# Patient Record
Sex: FEMALE | Race: WHITE | Employment: UNEMPLOYED | URBAN - METROPOLITAN AREA
[De-identification: names, ages, dates, MRNs, and addresses within clinical notes are randomized per-mention and may not be internally consistent; named-entity substitution may affect disease eponyms.]

---

## 2018-11-01 ENCOUNTER — HOSPITAL ENCOUNTER (EMERGENCY)
Age: 73
Discharge: HOME | End: 2018-11-01
Payer: MEDICARE

## 2018-11-01 ENCOUNTER — APPOINTMENT (OUTPATIENT)
Dept: CT IMAGING | Age: 73
End: 2018-11-01
Payer: MEDICARE

## 2018-11-01 VITALS
DIASTOLIC BLOOD PRESSURE: 89 MMHG | HEART RATE: 90 BPM | OXYGEN SATURATION: 96 % | BODY MASS INDEX: 24.19 KG/M2 | RESPIRATION RATE: 16 BRPM | HEIGHT: 59 IN | TEMPERATURE: 98.5 F | SYSTOLIC BLOOD PRESSURE: 144 MMHG | WEIGHT: 120 LBS

## 2018-11-01 DIAGNOSIS — S16.1XXA CERVICAL STRAIN, ACUTE, INITIAL ENCOUNTER: Primary | ICD-10-CM

## 2018-11-01 PROCEDURE — 99284 EMERGENCY DEPT VISIT MOD MDM: CPT

## 2018-11-01 PROCEDURE — 6370000000 HC RX 637 (ALT 250 FOR IP): Performed by: PHYSICIAN ASSISTANT

## 2018-11-01 PROCEDURE — 70450 CT HEAD/BRAIN W/O DYE: CPT

## 2018-11-01 PROCEDURE — 72125 CT NECK SPINE W/O DYE: CPT

## 2018-11-01 RX ORDER — ACETAMINOPHEN 325 MG/1
650 TABLET ORAL ONCE
Status: COMPLETED | OUTPATIENT
Start: 2018-11-01 | End: 2018-11-01

## 2018-11-01 RX ORDER — DIAZEPAM 2 MG/1
2 TABLET ORAL ONCE
Status: COMPLETED | OUTPATIENT
Start: 2018-11-01 | End: 2018-11-01

## 2018-11-01 RX ORDER — DIAZEPAM 2 MG/1
2 TABLET ORAL EVERY 8 HOURS PRN
Qty: 15 TABLET | Refills: 0 | Status: SHIPPED | OUTPATIENT
Start: 2018-11-01 | End: 2018-11-06

## 2018-11-01 RX ADMIN — DIAZEPAM 2 MG: 2 TABLET ORAL at 19:25

## 2018-11-01 RX ADMIN — ACETAMINOPHEN 650 MG: 325 TABLET ORAL at 19:25

## 2018-11-01 ASSESSMENT — PAIN DESCRIPTION - ORIENTATION: ORIENTATION: POSTERIOR

## 2018-11-01 ASSESSMENT — PAIN SCALES - GENERAL: PAINLEVEL_OUTOF10: 7

## 2018-11-01 ASSESSMENT — PAIN DESCRIPTION - LOCATION: LOCATION: HEAD

## 2018-11-01 ASSESSMENT — PAIN DESCRIPTION - DESCRIPTORS: DESCRIPTORS: ACHING

## 2018-11-01 ASSESSMENT — PAIN DESCRIPTION - FREQUENCY: FREQUENCY: CONTINUOUS

## 2018-11-01 ASSESSMENT — PAIN DESCRIPTION - PAIN TYPE: TYPE: ACUTE PAIN

## 2018-11-01 NOTE — ED PROVIDER NOTES
[facility]     Department of Emergency Medicine   ED  Provider Note  Admit Date/RoomTime: 11/1/2018  7:05 PM  ED Room: /  Chief Complaint   Fall (pt states she fell forward on sunday and hit right side of head. she is having a headache for the past few days. no LOC, no thinners. ) and Neck Pain (left neck)    History of Present Illness   Source of history provided by:  patient. History/Exam Limitations: none. Radha Green is a 75 y.o. old female who has a past medical history of:   Past Medical History:   Diagnosis Date    Hyperlipidemia     Thyroid disease     presents to the emergency department by private vehicle, for a fall which occured 6 day(s) prior to arrival. She was reportedly walking and lost balance leaning over while at home prior to incident with complaints of persistent left upper neck pain causing persistent headache. The patients tetanus status is up to date. Since onset the symptoms have been moderate in degree. Her pain is aggraveated by nothing in particular and relieved by nothing, she did try 800 motrin. She denies any loss of consciousness, chest pain, abdominal pain, numbness or weakness. .  ROS    Pertinent positives and negatives are stated within HPI, all other systems reviewed and are negative. Past Surgical History:   Procedure Laterality Date    APPENDECTOMY      FINGER TRIGGER RELEASE      HERNIA REPAIR      KNEE SURGERY Left 2016    LAPAROSCOPY  1997    OVARIAN CYST SURGERY  1962    RUPTURED OVARIAN CYST   Social History:  reports that she quit smoking about 21 years ago. She has never used smokeless tobacco. She reports that she drinks alcohol. She reports that she does not use drugs. Family History: family history includes Anxiety Disorder in her mother and another family member; Arthritis in her father and mother; Breast Cancer in her maternal aunt; Diabetes in her father, maternal aunt, and maternal aunt;  Heart Disease in her mother; Lung diffuse   degenerative changes are identified from C2 to T1 with osteophytes and   multilevel disc bulges. CT Head WO Contrast   Final Result      No acute intracranial hemorrhage or mass effect. Findings compatible with chronic microvascular ischemia. ED Course / Medical Decision Making     Medications   acetaminophen (TYLENOL) tablet 650 mg (650 mg Oral Given 11/1/18 1925)   diazepam (VALIUM) tablet 2 mg (2 mg Oral Given 11/1/18 1925)        Re-examination:  11/1/18     Time: Patient reports feeling a lot better after the Valium although it is making her sleepy. Consult(s):   None    Procedure(s):   none    MDM:   Patient presents with persistent left-sided neck pain and headache after falling several days ago. She kind of tripped forward as she was leaning too far forward and hit the side of her head on the door jam. Imaging both of head and cervical spine were negative for fracture or scleral hemorrhage. Patient was given some Valium for muscle relaxation and advised to continue that with the Tylenol. Follow-up with primary care doctor as needed if she needs physical therapy. Counseling: The emergency provider has spoken with the patient and spouse/SO and discussed todays results, in addition to providing specific details for the plan of care and counseling regarding the diagnosis and prognosis. Questions are answered at this time and they are agreeable with the plan. Assessment      1. Cervical strain, acute, initial encounter      Plan   Discharge to home  Patient condition is stable    New Medications     Discharge Medication List as of 11/1/2018  8:22 PM      START taking these medications    Details   diazepam (VALIUM) 2 MG tablet Take 1 tablet by mouth every 8 hours as needed (muscle tension) for up to 5 days. ., Disp-15 tablet, R-0Print           Electronically signed by Nicole Aceves PA-C   DD: 11/1/18  **This report was transcribed using voice

## 2020-12-20 ENCOUNTER — APPOINTMENT (OUTPATIENT)
Dept: CT IMAGING | Age: 75
End: 2020-12-20
Payer: MEDICARE

## 2020-12-20 ENCOUNTER — HOSPITAL ENCOUNTER (EMERGENCY)
Age: 75
Discharge: HOME | End: 2020-12-20
Attending: EMERGENCY MEDICINE
Payer: MEDICARE

## 2020-12-20 VITALS
BODY MASS INDEX: 24.6 KG/M2 | HEIGHT: 59 IN | SYSTOLIC BLOOD PRESSURE: 111 MMHG | RESPIRATION RATE: 20 BRPM | TEMPERATURE: 97.4 F | DIASTOLIC BLOOD PRESSURE: 73 MMHG | WEIGHT: 122 LBS | HEART RATE: 91 BPM | OXYGEN SATURATION: 97 %

## 2020-12-20 LAB
ALBUMIN SERPL-MCNC: 3.6 G/DL (ref 3.5–5.2)
ALP BLD-CCNC: 62 U/L (ref 35–104)
ALT SERPL-CCNC: 20 U/L (ref 0–32)
ANION GAP SERPL CALCULATED.3IONS-SCNC: 9 MMOL/L (ref 7–16)
AST SERPL-CCNC: 42 U/L (ref 0–31)
BACTERIA: ABNORMAL /HPF
BASOPHILS ABSOLUTE: 0.04 E9/L (ref 0–0.2)
BASOPHILS RELATIVE PERCENT: 0.3 % (ref 0–2)
BILIRUB SERPL-MCNC: 0.2 MG/DL (ref 0–1.2)
BILIRUBIN URINE: NEGATIVE
BLOOD, URINE: ABNORMAL
BUN BLDV-MCNC: 16 MG/DL (ref 8–23)
CALCIUM SERPL-MCNC: 8 MG/DL (ref 8.6–10.2)
CHLORIDE BLD-SCNC: 109 MMOL/L (ref 98–107)
CLARITY: CLEAR
CO2: 22 MMOL/L (ref 22–29)
COLOR: YELLOW
CREAT SERPL-MCNC: 0.8 MG/DL (ref 0.5–1)
EOSINOPHILS ABSOLUTE: 0.09 E9/L (ref 0.05–0.5)
EOSINOPHILS RELATIVE PERCENT: 0.7 % (ref 0–6)
GFR AFRICAN AMERICAN: >60
GFR NON-AFRICAN AMERICAN: >60 ML/MIN/1.73
GLUCOSE BLD-MCNC: 108 MG/DL (ref 74–99)
GLUCOSE URINE: NEGATIVE MG/DL
HCT VFR BLD CALC: 40 % (ref 34–48)
HEMOGLOBIN: 13.1 G/DL (ref 11.5–15.5)
IMMATURE GRANULOCYTES #: 0.04 E9/L
IMMATURE GRANULOCYTES %: 0.3 % (ref 0–5)
KETONES, URINE: NEGATIVE MG/DL
LACTIC ACID, SEPSIS: 2.3 MMOL/L (ref 0.5–1.9)
LEUKOCYTE ESTERASE, URINE: ABNORMAL
LYMPHOCYTES ABSOLUTE: 0.96 E9/L (ref 1.5–4)
LYMPHOCYTES RELATIVE PERCENT: 8 % (ref 20–42)
MCH RBC QN AUTO: 30.5 PG (ref 26–35)
MCHC RBC AUTO-ENTMCNC: 32.8 % (ref 32–34.5)
MCV RBC AUTO: 93.2 FL (ref 80–99.9)
MONOCYTES ABSOLUTE: 0.55 E9/L (ref 0.1–0.95)
MONOCYTES RELATIVE PERCENT: 4.6 % (ref 2–12)
NEUTROPHILS ABSOLUTE: 10.35 E9/L (ref 1.8–7.3)
NEUTROPHILS RELATIVE PERCENT: 86.1 % (ref 43–80)
NITRITE, URINE: NEGATIVE
PDW BLD-RTO: 13.2 FL (ref 11.5–15)
PH UA: 7 (ref 5–9)
PLATELET # BLD: 231 E9/L (ref 130–450)
PMV BLD AUTO: 10.2 FL (ref 7–12)
POTASSIUM REFLEX MAGNESIUM: 5.2 MMOL/L (ref 3.5–5)
PROTEIN UA: NEGATIVE MG/DL
RBC # BLD: 4.29 E12/L (ref 3.5–5.5)
RBC UA: ABNORMAL /HPF (ref 0–2)
SODIUM BLD-SCNC: 140 MMOL/L (ref 132–146)
SPECIFIC GRAVITY UA: 1.01 (ref 1–1.03)
TOTAL PROTEIN: 6.1 G/DL (ref 6.4–8.3)
UROBILINOGEN, URINE: 0.2 E.U./DL
WBC # BLD: 12 E9/L (ref 4.5–11.5)
WBC UA: ABNORMAL /HPF (ref 0–5)

## 2020-12-20 PROCEDURE — 2580000003 HC RX 258: Performed by: EMERGENCY MEDICINE

## 2020-12-20 PROCEDURE — 83605 ASSAY OF LACTIC ACID: CPT

## 2020-12-20 PROCEDURE — 96375 TX/PRO/DX INJ NEW DRUG ADDON: CPT

## 2020-12-20 PROCEDURE — 87088 URINE BACTERIA CULTURE: CPT

## 2020-12-20 PROCEDURE — 96374 THER/PROPH/DIAG INJ IV PUSH: CPT

## 2020-12-20 PROCEDURE — 6360000002 HC RX W HCPCS: Performed by: EMERGENCY MEDICINE

## 2020-12-20 PROCEDURE — 74177 CT ABD & PELVIS W/CONTRAST: CPT

## 2020-12-20 PROCEDURE — 80053 COMPREHEN METABOLIC PANEL: CPT

## 2020-12-20 PROCEDURE — 2580000003 HC RX 258: Performed by: NURSE PRACTITIONER

## 2020-12-20 PROCEDURE — 6360000002 HC RX W HCPCS: Performed by: NURSE PRACTITIONER

## 2020-12-20 PROCEDURE — 81001 URINALYSIS AUTO W/SCOPE: CPT

## 2020-12-20 PROCEDURE — 2580000003 HC RX 258: Performed by: RADIOLOGY

## 2020-12-20 PROCEDURE — 85025 COMPLETE CBC W/AUTO DIFF WBC: CPT

## 2020-12-20 PROCEDURE — 99285 EMERGENCY DEPT VISIT HI MDM: CPT

## 2020-12-20 PROCEDURE — 6360000004 HC RX CONTRAST MEDICATION: Performed by: RADIOLOGY

## 2020-12-20 RX ORDER — ONDANSETRON 2 MG/ML
4 INJECTION INTRAMUSCULAR; INTRAVENOUS
Status: DISCONTINUED | OUTPATIENT
Start: 2020-12-20 | End: 2020-12-20 | Stop reason: HOSPADM

## 2020-12-20 RX ORDER — MORPHINE SULFATE 4 MG/ML
4 INJECTION, SOLUTION INTRAMUSCULAR; INTRAVENOUS ONCE
Status: COMPLETED | OUTPATIENT
Start: 2020-12-20 | End: 2020-12-20

## 2020-12-20 RX ORDER — HYDROCODONE BITARTRATE AND ACETAMINOPHEN 5; 325 MG/1; MG/1
1 TABLET ORAL EVERY 6 HOURS PRN
Qty: 10 TABLET | Refills: 0 | Status: SHIPPED | OUTPATIENT
Start: 2020-12-20 | End: 2020-12-23

## 2020-12-20 RX ORDER — ONDANSETRON 4 MG/1
4 TABLET, FILM COATED ORAL EVERY 8 HOURS PRN
Qty: 20 TABLET | Refills: 0 | Status: SHIPPED | OUTPATIENT
Start: 2020-12-20

## 2020-12-20 RX ORDER — SODIUM CHLORIDE 0.9 % (FLUSH) 0.9 %
10 SYRINGE (ML) INJECTION
Status: COMPLETED | OUTPATIENT
Start: 2020-12-20 | End: 2020-12-20

## 2020-12-20 RX ORDER — KETOROLAC TROMETHAMINE 30 MG/ML
15 INJECTION, SOLUTION INTRAMUSCULAR; INTRAVENOUS ONCE
Status: COMPLETED | OUTPATIENT
Start: 2020-12-20 | End: 2020-12-20

## 2020-12-20 RX ORDER — 0.9 % SODIUM CHLORIDE 0.9 %
500 INTRAVENOUS SOLUTION INTRAVENOUS ONCE
Status: COMPLETED | OUTPATIENT
Start: 2020-12-20 | End: 2020-12-20

## 2020-12-20 RX ORDER — IBUPROFEN 800 MG/1
800 TABLET ORAL EVERY 8 HOURS PRN
Qty: 12 TABLET | Refills: 0 | Status: SHIPPED | OUTPATIENT
Start: 2020-12-20

## 2020-12-20 RX ORDER — POLYETHYLENE GLYCOL 3350 17 G/17G
17 POWDER, FOR SOLUTION ORAL DAILY
Qty: 1530 G | Refills: 1 | Status: SHIPPED | OUTPATIENT
Start: 2020-12-20 | End: 2021-01-19

## 2020-12-20 RX ADMIN — IOPAMIDOL 90 ML: 755 INJECTION, SOLUTION INTRAVENOUS at 12:28

## 2020-12-20 RX ADMIN — MORPHINE SULFATE 4 MG: 4 INJECTION, SOLUTION INTRAMUSCULAR; INTRAVENOUS at 10:41

## 2020-12-20 RX ADMIN — KETOROLAC TROMETHAMINE 15 MG: 30 INJECTION, SOLUTION INTRAMUSCULAR at 14:12

## 2020-12-20 RX ADMIN — CEFTRIAXONE SODIUM 2 G: 2 INJECTION, POWDER, FOR SOLUTION INTRAMUSCULAR; INTRAVENOUS at 15:04

## 2020-12-20 RX ADMIN — Medication 10 ML: at 12:29

## 2020-12-20 RX ADMIN — SODIUM CHLORIDE 500 ML: 9 INJECTION, SOLUTION INTRAVENOUS at 10:43

## 2020-12-20 ASSESSMENT — PAIN SCALES - GENERAL
PAINLEVEL_OUTOF10: 7
PAINLEVEL_OUTOF10: 5
PAINLEVEL_OUTOF10: 9

## 2020-12-20 ASSESSMENT — PAIN DESCRIPTION - ORIENTATION: ORIENTATION: LEFT;LOWER

## 2020-12-20 ASSESSMENT — PAIN DESCRIPTION - PAIN TYPE: TYPE: ACUTE PAIN

## 2020-12-20 ASSESSMENT — PAIN DESCRIPTION - LOCATION: LOCATION: ABDOMEN;BACK

## 2020-12-20 NOTE — ED PROVIDER NOTES
ED Attending  CC: Lotus Zepeda [id number]  Department of Emergency Medicine   ED  Encounter Note  Admit Date/RoomTime: 2020  9:42 AM  ED Room: [facility]    NAME: Christi Brown  : 1945  MRN: [id number]     Chief Complaint:  Abdominal Pain (LLQ pain radiating into back, receieved 50mcg fentanyl PTA by EMS. States she woke up this AM at 7am with it. Also reports no BM since Thursday and that her urine is coon yellow with a strong odor. )    History of Present Illness        Christi Brown is a 67 y.o. old female who presents to the emergency department by private vehicle for gradual onset sharp achy left quadrant and left flank pain that began at 7 AM this morning. She states that this did not wake her up out of sleep. She fell she had to have a bowel movement as she has not had one over the last 2 days. As she attempted to have a bowel movement she had worsening of her pain with nausea, no vomiting, and chills. She contacted her PCP who referred her to the emergency department for a CAT scan. She believes she has a history of diverticulosis and denies a history of kidney stones. She has noticed that her urine is a coon color without any dysuria or hematuria. She admits to a history of ovarian cyst in the past and has not noticed any vaginal bleeding or discharge. There is no associated fever, syncope, chest pain or shortness of breath. She received fentanyl and Zofran via ambulance with improvement in her symptoms however the pain is persistent. ROS   Pertinent positives and negatives are stated within HPI, all other systems reviewed and are negative. Past Medical History:  has a past medical history of Hyperlipidemia and Thyroid disease. Surgical History has a past surgical history that includes hernia repair; Appendectomy; Finger trigger release; laparoscopy (); Ovarian cyst surgery (); and knee surgery (Left, 2016).     Social History:  reports that she quit smoking about 23 years ago. She has never used smokeless tobacco. She reports current alcohol use. She reports that she does not use drugs. Family History: family history includes Anxiety Disorder in her mother and another family member; Arthritis in her father and mother; Breast Cancer in her maternal aunt; Diabetes in her father, maternal aunt, and maternal aunt; Heart Disease in her mother; Yaaka Liter in her father; Migraines in her maternal grandmother; Other in her brother. Allergies: Patient has no known allergies. Physical Exam   Oxygen Saturation Interpretation: Normal.        ED Triage Vitals [12/20/20 0944]   BP Temp Temp Source Pulse Resp SpO2 Height Weight   (!) 155/80 97.4 °F (36.3 °C) Temporal 81 18 97 % 4' 11\" (1.499 m) 122 lb (55.3 kg)         General Appearance/Constitutional:  Alert, development consistent with age  [de-identified]:  NC/NT. PERRLA. Airway patent. Neck:  Supple. No lymphadenopathy. Respiratory:  No retractions. Lungs Clear to auscultation and breath sounds equal.  CV:  Regular rate and rhythm. No resting tachycardia or murmur. GI:  Non-distended, old scar         Bowel sounds: normal bowel sounds. Tenderness: There is moderate tenderness to the left lower quadrant with guarding and no rebound. There is also tenderness to the left flank without vesicular rash or outward sign of trauma. No tenderness through the upper abdomen or right lower quadrant. Liver: non-tender. Spleen:  non-palpable. Back: CVA Tenderness: No.  Integument:  Normal turgor. Warm, dry, without visible rash, unless noted elsewhere. Lymphatics: No edema, cap.refill <3sec. Neurological:  Orientation age-appropriate. Motor functions intact.     Lab / Imaging Results   (All laboratory and radiology results have been personally reviewed by myself)  Labs:  Results for orders placed or performed during the hospital encounter of 12/20/20   CBC Auto Differential   Result Value Ref Range    WBC 12.0 (H) 4.5 - 11.5 E9/L    RBC 4.29 3.50 - 5.50 E12/L    Hemoglobin 13.1 11.5 - 15.5 g/dL    Hematocrit 40.0 34.0 - 48.0 %    MCV 93.2 80.0 - 99.9 fL    MCH 30.5 26.0 - 35.0 pg    MCHC 32.8 32.0 - 34.5 %    RDW 13.2 11.5 - 15.0 fL    Platelets 635 348 - 662 E9/L    MPV 10.2 7.0 - 12.0 fL    Neutrophils % 86.1 (H) 43.0 - 80.0 %    Immature Granulocytes % 0.3 0.0 - 5.0 %    Lymphocytes % 8.0 (L) 20.0 - 42.0 %    Monocytes % 4.6 2.0 - 12.0 %    Eosinophils % 0.7 0.0 - 6.0 %    Basophils % 0.3 0.0 - 2.0 %    Neutrophils Absolute 10.35 (H) 1.80 - 7.30 E9/L    Immature Granulocytes # 0.04 E9/L    Lymphocytes Absolute 0.96 (L) 1.50 - 4.00 E9/L    Monocytes Absolute 0.55 0.10 - 0.95 E9/L    Eosinophils Absolute 0.09 0.05 - 0.50 E9/L    Basophils Absolute 0.04 0.00 - 0.20 E9/L   Urinalysis   Result Value Ref Range    Color, UA Yellow Straw/Yellow    Clarity, UA Clear Clear    Glucose, Ur Negative Negative mg/dL    Bilirubin Urine Negative Negative    Ketones, Urine Negative Negative mg/dL    Specific Gravity, UA 1.015 1.005 - 1.030    Blood, Urine MODERATE (A) Negative    pH, UA 7.0 5.0 - 9.0    Protein, UA Negative Negative mg/dL    Urobilinogen, Urine 0.2 <2.0 E.U./dL    Nitrite, Urine Negative Negative    Leukocyte Esterase, Urine TRACE (A) Negative   Lactate, Sepsis   Result Value Ref Range    Lactic Acid, Sepsis 2.3 (H) 0.5 - 1.9 mmol/L   Comprehensive Metabolic Panel w/ Reflex to MG   Result Value Ref Range    Sodium 140 132 - 146 mmol/L    Potassium reflex Magnesium 5.2 (H) 3.5 - 5.0 mmol/L    Chloride 109 (H) 98 - 107 mmol/L    CO2 22 22 - 29 mmol/L    Anion Gap 9 7 - 16 mmol/L    Glucose 108 (H) 74 - 99 mg/dL    BUN 16 8 - 23 mg/dL    CREATININE 0.8 0.5 - 1.0 mg/dL    GFR Non-African American >60 >=60 mL/min/1.73    GFR African American >60     Calcium 8.0 (L) 8.6 - 10.2 mg/dL    Total Protein 6.1 (L) 6.4 - 8.3 g/dL    Alb 3.6 3.5 - 5.2 g/dL    Total Bilirubin 0.2 0.0 - 1.2 mg/dL    Alkaline Phosphatase 62 35 - 104 U/L    ALT 20 0 - 32 U/L    AST 42 (H) 0 - 31 U/L   Microscopic Urinalysis   Result Value Ref Range    WBC, UA 0-1 0 - 5 /HPF    RBC, UA 10-20 (A) 0 - 2 /HPF    Bacteria, UA RARE (A) None Seen /HPF     Imaging: All Radiology results interpreted by Radiologist unless otherwise noted. CT ABDOMEN PELVIS W IV CONTRAST Additional Contrast? None   Final Result   Presence of a 3 mm calculus in the left ureter vesicle junction (UVJ) at the   bladder side of the junction causing discrete to mild obstructive uropathy in   the left kidney. The calculus near to be fully passed. See other comments and recommendations. ED Course / Medical Decision Making     Medications   0.9 % sodium chloride bolus (0 mLs Intravenous Stopped 12/20/20 1338)   morphine sulfate (PF) injection 4 mg (4 mg Intravenous Given 12/20/20 1041)   sodium chloride flush 0.9 % injection 10 mL (10 mLs Intravenous Given 12/20/20 1229)   iopamidol (ISOVUE-370) 76 % injection 90 mL (90 mLs Intravenous Given 12/20/20 1228)   ketorolac (TORADOL) injection 15 mg (15 mg Intravenous Given 12/20/20 1412)   cefTRIAXone (ROCEPHIN) 2 g in sterile water 20 mL IV syringe (2 g Intravenous Given 12/20/20 1504)        Re-Evaluations:  12/20/20      Time: 3024   Patients condition is improving after treatment. Abdomen remains soft and patient states her pain is markedly improved after the morphine. She is awaiting to go to CT. Consultations:             None    Procedures:   none    MDM: Patient evaluated by myself and  [facility]. Given her recent constipation and she believes to have a history of diverticulosis, despite her flank pain and coon color urine, it was decided she have a CT with IV contrast to rule out diverticulitis over a dry scan for renal lithiasis. She had improvement with IV morphine while pending her scans. Labs as resulted above.   Care endorsed to  [facility] at [phone] for review of CT results, reevaluation of the patient and disposition. Plan of Care/Counseling:  Myself and Emergency Attending Physician reviewed today's visit with the patient in addition to providing specific details for the plan of care and counseling regarding the diagnosis and prognosis. Questions are answered at this time and are agreeable with the plan. Assessment      1. Kidney stone      This patient's ED course included: a personal history and physicial examination, re-evaluation prior to disposition, multiple bedside re-evaluations and IV medications  This patient has remained hemodynamically stable, improved and remained unchanged during their ED course. Plan   Discharge to home  Patient condition is stable. New Medications     Discharge Medication List as of 12/20/2020  3:26 PM      START taking these medications    Details   polyethylene glycol (GLYCOLAX) 17 GM/SCOOP powder Take 17 g by mouth daily, Disp-1530 g, R-1Print      ondansetron (ZOFRAN) 4 MG tablet Take 1 tablet by mouth every 8 hours as needed for Nausea or Vomiting, Disp-20 tablet, R-0Print      HYDROcodone-acetaminophen (NORCO) 5-325 MG per tablet Take 1 tablet by mouth every 6 hours as needed for Pain for up to 3 days. , Disp-10 tablet, R-0Print      tamsulosin (FLOMAX) 0.4 MG capsule Take 1 capsule by mouth daily, Disp-7 capsule, R-0Print           Electronically signed by GREY Law CNP   DD: 12/20/20  **This report was transcribed using voice recognition software. Every effort was made to ensure accuracy; however, inadvertent computerized transcription errors may be present.   END OF PROVIDER NOTE     GREY Law CNP  12/20/20 1791

## 2020-12-20 NOTE — ED NOTES
Bed: HD  Expected date:   Expected time:   Means of arrival:   Comments:  Jonatan Mauricio RN  12/20/20 5350

## 2020-12-22 LAB — URINE CULTURE, ROUTINE: NORMAL

## 2021-02-01 ENCOUNTER — IMMUNIZATION (OUTPATIENT)
Dept: PRIMARY CARE CLINIC | Age: 76
End: 2021-02-01
Payer: MEDICARE

## 2021-02-01 DIAGNOSIS — Z23 NEED FOR VACCINATION: Primary | ICD-10-CM

## 2021-02-01 PROCEDURE — 0001A COVID-19, PFIZER VACCINE 30MCG/0.3ML DOSE: CPT | Performed by: CLINICAL NURSE SPECIALIST

## 2021-02-01 PROCEDURE — 91300 COVID-19, PFIZER VACCINE 30MCG/0.3ML DOSE: CPT | Performed by: CLINICAL NURSE SPECIALIST

## 2021-02-22 ENCOUNTER — IMMUNIZATION (OUTPATIENT)
Dept: PRIMARY CARE CLINIC | Age: 76
End: 2021-02-22
Payer: MEDICARE

## 2021-02-22 PROCEDURE — 91300 COVID-19, PFIZER VACCINE 30MCG/0.3ML DOSE: CPT | Performed by: CLINICAL NURSE SPECIALIST

## 2021-02-22 PROCEDURE — 0002A COVID-19, PFIZER VACCINE 30MCG/0.3ML DOSE: CPT | Performed by: CLINICAL NURSE SPECIALIST

## 2022-01-11 ENCOUNTER — HOSPITAL ENCOUNTER (OUTPATIENT)
Age: 77
Discharge: HOME | End: 2022-01-13

## 2022-01-11 PROCEDURE — 88305 TISSUE EXAM BY PATHOLOGIST: CPT

## 2022-01-11 PROCEDURE — 88342 IMHCHEM/IMCYTCHM 1ST ANTB: CPT

## 2023-01-09 ENCOUNTER — HOSPITAL ENCOUNTER (OUTPATIENT)
Dept: NUCLEAR MEDICINE | Age: 78
Discharge: HOME | End: 2023-01-09
Payer: MEDICARE

## 2023-01-09 ENCOUNTER — HOSPITAL ENCOUNTER (OUTPATIENT)
Dept: NON INVASIVE DIAGNOSTICS | Age: 78
Discharge: HOME | End: 2023-01-09
Payer: MEDICARE

## 2023-01-09 VITALS — BODY MASS INDEX: 23.59 KG/M2 | WEIGHT: 117 LBS | HEIGHT: 59 IN

## 2023-01-09 LAB
LV EF: 69 %
LVEF MODALITY: NORMAL

## 2023-01-09 PROCEDURE — 3430000000 HC RX DIAGNOSTIC RADIOPHARMACEUTICAL: Performed by: RADIOLOGY

## 2023-01-09 PROCEDURE — A9500 TC99M SESTAMIBI: HCPCS | Performed by: RADIOLOGY

## 2023-01-09 PROCEDURE — 78452 HT MUSCLE IMAGE SPECT MULT: CPT

## 2023-01-09 PROCEDURE — 93016 CV STRESS TEST SUPVJ ONLY: CPT | Performed by: INTERNAL MEDICINE

## 2023-01-09 PROCEDURE — 6360000002 HC RX W HCPCS: Performed by: INTERNAL MEDICINE

## 2023-01-09 PROCEDURE — 93017 CV STRESS TEST TRACING ONLY: CPT

## 2023-01-09 PROCEDURE — 93018 CV STRESS TEST I&R ONLY: CPT | Performed by: INTERNAL MEDICINE

## 2023-01-09 PROCEDURE — 78452 HT MUSCLE IMAGE SPECT MULT: CPT | Performed by: INTERNAL MEDICINE

## 2023-01-09 RX ORDER — TECHNETIUM TC-99M SESTAMIBI 1 MG/10ML
10 INJECTION INTRAVENOUS
Status: COMPLETED | OUTPATIENT
Start: 2023-01-09 | End: 2023-01-09

## 2023-01-09 RX ORDER — TECHNETIUM TC-99M SESTAMIBI 1 MG/10ML
30 INJECTION INTRAVENOUS
Status: COMPLETED | OUTPATIENT
Start: 2023-01-09 | End: 2023-01-09

## 2023-01-09 RX ADMIN — Medication 10 MILLICURIE: at 09:38

## 2023-01-09 RX ADMIN — Medication 30 MILLICURIE: at 09:38

## 2023-01-09 RX ADMIN — REGADENOSON 0.4 MG: 0.08 INJECTION, SOLUTION INTRAVENOUS at 10:05

## 2023-01-09 NOTE — PROCEDURES
[facility] Nuclear Stress Test:    Cardiologist: Dr. Purvi Jones MD    Date: 1/9/2023    Indications for study: Chest pain    No chest pain  No new arrhythmias  No EKG changes suggestive of stress induced ischemia  Nuclear images pending    Purvi Jones MD  [facility]) Cardiology

## 2023-11-25 ENCOUNTER — HOSPITAL ENCOUNTER (EMERGENCY)
Age: 78
Discharge: HOME | End: 2023-11-25
Attending: STUDENT IN AN ORGANIZED HEALTH CARE EDUCATION/TRAINING PROGRAM
Payer: MEDICARE

## 2023-11-25 ENCOUNTER — APPOINTMENT (OUTPATIENT)
Dept: CT IMAGING | Age: 78
End: 2023-11-25
Payer: MEDICARE

## 2023-11-25 VITALS
BODY MASS INDEX: 24.24 KG/M2 | HEART RATE: 96 BPM | DIASTOLIC BLOOD PRESSURE: 73 MMHG | WEIGHT: 120 LBS | RESPIRATION RATE: 17 BRPM | TEMPERATURE: 97.5 F | OXYGEN SATURATION: 97 % | SYSTOLIC BLOOD PRESSURE: 129 MMHG

## 2023-11-25 DIAGNOSIS — S09.90XA INJURY OF HEAD, INITIAL ENCOUNTER: Primary | ICD-10-CM

## 2023-11-25 PROCEDURE — 99284 EMERGENCY DEPT VISIT MOD MDM: CPT

## 2023-11-25 PROCEDURE — 70450 CT HEAD/BRAIN W/O DYE: CPT

## 2023-11-25 ASSESSMENT — PAIN DESCRIPTION - ONSET: ONSET: ON-GOING

## 2023-11-25 ASSESSMENT — LIFESTYLE VARIABLES
HOW OFTEN DO YOU HAVE A DRINK CONTAINING ALCOHOL: NEVER
HOW MANY STANDARD DRINKS CONTAINING ALCOHOL DO YOU HAVE ON A TYPICAL DAY: PATIENT DOES NOT DRINK

## 2023-11-25 ASSESSMENT — PAIN DESCRIPTION - PAIN TYPE: TYPE: ACUTE PAIN

## 2023-11-25 ASSESSMENT — PAIN - FUNCTIONAL ASSESSMENT: PAIN_FUNCTIONAL_ASSESSMENT: 0-10

## 2023-11-25 ASSESSMENT — PAIN SCALES - GENERAL: PAINLEVEL_OUTOF10: 5

## 2023-11-25 ASSESSMENT — PAIN DESCRIPTION - LOCATION: LOCATION: HEAD

## 2023-11-25 ASSESSMENT — PAIN DESCRIPTION - DESCRIPTORS: DESCRIPTORS: ACHING;DISCOMFORT;SORE

## 2023-11-25 ASSESSMENT — PAIN DESCRIPTION - FREQUENCY: FREQUENCY: INTERMITTENT

## 2023-11-25 NOTE — ED PROVIDER NOTES
[address] ENCOUNTER    Pt Name: Tory Bowens  MRN: [id number]  [address] [facility] [place] 1945  Date of evaluation: 11/25/2023  Provider: Allegra Closs, MD  PCP: Omar Card MD  Note Started: 3:21 PM EST 11/25/23    HPI     Patient is a 77 y.o. female presents with a chief complaint of   Chief Complaint   Patient presents with    Head Injury     Hit head on 11/19 on a door, no thinners, no LOC, states since bumping head has pain and feels woozy    . Patient presents because she hit her head. Patient stated that this happened 5 days ago. Patient stated that she is not on any blood thinners and no loss conscious. States that she feels \"woozy. \"  Patient states that she is worried because her  previously had a brain bleed and is worried she may have 1. Denies any fevers, chills, nausea, vomiting, chest pain, shortness of breath, abdominal pain, changes in urinary or bowel habits. Patient denies any changes in vision. Patient stated that she accidentally hit her head lightly on a door and felt following for 2 days. Nursing Notes were all reviewed and agreed with or any disagreements were addressed in the HPI. History From: Patient    Review of Systems   Pertinent positives and negatives as per HPI. Physical Exam  Vitals and nursing note reviewed. Constitutional:       Appearance: She is well-developed. HENT:      Head: Normocephalic and atraumatic. Eyes:      Conjunctiva/sclera: Conjunctivae normal.   Cardiovascular:      Rate and Rhythm: Normal rate and regular rhythm. Heart sounds: Normal heart sounds. No murmur heard. Pulmonary:      Effort: Pulmonary effort is normal. No respiratory distress. Breath sounds: Normal breath sounds. No wheezing or rales. Abdominal:      General: Bowel sounds are normal.      Palpations: Abdomen is soft. Tenderness: There is no abdominal tenderness.  There is no

## 2023-11-25 NOTE — ED NOTES
Pt steady when walking in the iraheta  Sitting quietly playing card game on [address], [address], [address] [place] [address]  11/25/23 9944

## 2024-01-21 ENCOUNTER — APPOINTMENT (OUTPATIENT)
Dept: GENERAL RADIOLOGY | Age: 79
End: 2024-01-21
Payer: MEDICARE

## 2024-01-21 ENCOUNTER — HOSPITAL ENCOUNTER (EMERGENCY)
Age: 79
Discharge: ANOTHER HOSPITAL | End: 2024-01-22
Attending: STUDENT IN AN ORGANIZED HEALTH CARE EDUCATION/TRAINING PROGRAM
Payer: MEDICARE

## 2024-01-21 DIAGNOSIS — S82.892A CLOSED FRACTURE OF MALLEOLUS OF BOTH ANKLES, INITIAL ENCOUNTER: Primary | ICD-10-CM

## 2024-01-21 DIAGNOSIS — S82.891A CLOSED FRACTURE OF MALLEOLUS OF BOTH ANKLES, INITIAL ENCOUNTER: Primary | ICD-10-CM

## 2024-01-21 LAB
ANION GAP SERPL CALCULATED.3IONS-SCNC: 10 MMOL/L (ref 7–16)
BASOPHILS # BLD: 0.05 K/UL (ref 0–0.2)
BASOPHILS NFR BLD: 1 % (ref 0–2)
BUN SERPL-MCNC: 17 MG/DL (ref 6–23)
CALCIUM SERPL-MCNC: 9.4 MG/DL (ref 8.6–10.2)
CHLORIDE SERPL-SCNC: 102 MMOL/L (ref 98–107)
CO2 SERPL-SCNC: 26 MMOL/L (ref 22–29)
CREAT SERPL-MCNC: 0.7 MG/DL (ref 0.5–1)
EOSINOPHIL # BLD: 0.18 K/UL (ref 0.05–0.5)
EOSINOPHILS RELATIVE PERCENT: 2 % (ref 0–6)
ERYTHROCYTE [DISTWIDTH] IN BLOOD BY AUTOMATED COUNT: 13.8 % (ref 11.5–15)
GFR SERPL CREATININE-BSD FRML MDRD: >60 ML/MIN/1.73M2
GLUCOSE SERPL-MCNC: 93 MG/DL (ref 74–99)
HCT VFR BLD AUTO: 40 % (ref 34–48)
HGB BLD-MCNC: 13 G/DL (ref 11.5–15.5)
IMM GRANULOCYTES # BLD AUTO: 0.06 K/UL (ref 0–0.58)
IMM GRANULOCYTES NFR BLD: 1 % (ref 0–5)
INR PPP: 1
LYMPHOCYTES NFR BLD: 1.5 K/UL (ref 1.5–4)
LYMPHOCYTES RELATIVE PERCENT: 14 % (ref 20–42)
MCH RBC QN AUTO: 29.5 PG (ref 26–35)
MCHC RBC AUTO-ENTMCNC: 32.5 G/DL (ref 32–34.5)
MCV RBC AUTO: 90.7 FL (ref 80–99.9)
MONOCYTES NFR BLD: 0.8 K/UL (ref 0.1–0.95)
MONOCYTES NFR BLD: 7 % (ref 2–12)
NEUTROPHILS NFR BLD: 77 % (ref 43–80)
NEUTS SEG NFR BLD: 8.44 K/UL (ref 1.8–7.3)
PLATELET # BLD AUTO: 332 K/UL (ref 130–450)
PMV BLD AUTO: 10 FL (ref 7–12)
POTASSIUM SERPL-SCNC: 3.9 MMOL/L (ref 3.5–5)
PROTHROMBIN TIME: 11.7 SEC (ref 9.3–12.4)
RBC # BLD AUTO: 4.41 M/UL (ref 3.5–5.5)
SODIUM SERPL-SCNC: 138 MMOL/L (ref 132–146)
WBC OTHER # BLD: 11 K/UL (ref 4.5–11.5)

## 2024-01-21 PROCEDURE — 73610 X-RAY EXAM OF ANKLE: CPT

## 2024-01-21 PROCEDURE — 99285 EMERGENCY DEPT VISIT HI MDM: CPT

## 2024-01-21 PROCEDURE — 2500000003 HC RX 250 WO HCPCS

## 2024-01-21 PROCEDURE — 6360000002 HC RX W HCPCS: Performed by: STUDENT IN AN ORGANIZED HEALTH CARE EDUCATION/TRAINING PROGRAM

## 2024-01-21 PROCEDURE — 80048 BASIC METABOLIC PNL TOTAL CA: CPT

## 2024-01-21 PROCEDURE — 96374 THER/PROPH/DIAG INJ IV PUSH: CPT

## 2024-01-21 PROCEDURE — 85025 COMPLETE CBC W/AUTO DIFF WBC: CPT

## 2024-01-21 PROCEDURE — 27810 TREATMENT OF ANKLE FRACTURE: CPT

## 2024-01-21 PROCEDURE — 96376 TX/PRO/DX INJ SAME DRUG ADON: CPT

## 2024-01-21 PROCEDURE — 85610 PROTHROMBIN TIME: CPT

## 2024-01-21 PROCEDURE — 96375 TX/PRO/DX INJ NEW DRUG ADDON: CPT

## 2024-01-21 PROCEDURE — 6360000002 HC RX W HCPCS

## 2024-01-21 PROCEDURE — 73600 X-RAY EXAM OF ANKLE: CPT

## 2024-01-21 RX ORDER — ETOMIDATE 2 MG/ML
0.15 INJECTION INTRAVENOUS ONCE
Status: COMPLETED | OUTPATIENT
Start: 2024-01-21 | End: 2024-01-21

## 2024-01-21 RX ORDER — LORAZEPAM 0.5 MG/1
0.5 TABLET ORAL ONCE
Status: COMPLETED | OUTPATIENT
Start: 2024-01-21 | End: 2024-01-22

## 2024-01-21 RX ORDER — FENTANYL CITRATE 50 UG/ML
50 INJECTION, SOLUTION INTRAMUSCULAR; INTRAVENOUS ONCE
Status: COMPLETED | OUTPATIENT
Start: 2024-01-21 | End: 2024-01-21

## 2024-01-21 RX ORDER — OXYCODONE HYDROCHLORIDE AND ACETAMINOPHEN 5; 325 MG/1; MG/1
1 TABLET ORAL ONCE
Status: COMPLETED | OUTPATIENT
Start: 2024-01-21 | End: 2024-01-22

## 2024-01-21 RX ORDER — MORPHINE SULFATE 4 MG/ML
4 INJECTION, SOLUTION INTRAMUSCULAR; INTRAVENOUS ONCE
Status: COMPLETED | OUTPATIENT
Start: 2024-01-21 | End: 2024-01-21

## 2024-01-21 RX ORDER — HYDROXYZINE HYDROCHLORIDE 10 MG/1
10 TABLET, FILM COATED ORAL ONCE
Status: DISCONTINUED | OUTPATIENT
Start: 2024-01-21 | End: 2024-01-21

## 2024-01-21 RX ORDER — ONDANSETRON 2 MG/ML
4 INJECTION INTRAMUSCULAR; INTRAVENOUS ONCE
Status: COMPLETED | OUTPATIENT
Start: 2024-01-21 | End: 2024-01-21

## 2024-01-21 RX ADMIN — FENTANYL CITRATE 50 MCG: 50 INJECTION, SOLUTION INTRAMUSCULAR; INTRAVENOUS at 21:10

## 2024-01-21 RX ADMIN — ONDANSETRON 4 MG: 2 INJECTION INTRAMUSCULAR; INTRAVENOUS at 21:52

## 2024-01-21 RX ADMIN — FENTANYL CITRATE 50 MCG: 50 INJECTION, SOLUTION INTRAMUSCULAR; INTRAVENOUS at 15:28

## 2024-01-21 RX ADMIN — MORPHINE SULFATE 4 MG: 4 INJECTION, SOLUTION INTRAMUSCULAR; INTRAVENOUS at 16:18

## 2024-01-21 RX ADMIN — ETOMIDATE 8.2 MG: 2 INJECTION, SOLUTION INTRAVENOUS at 16:39

## 2024-01-21 RX ADMIN — HYDROMORPHONE HYDROCHLORIDE 0.5 MG: 1 INJECTION, SOLUTION INTRAMUSCULAR; INTRAVENOUS; SUBCUTANEOUS at 18:52

## 2024-01-21 ASSESSMENT — LIFESTYLE VARIABLES
HOW OFTEN DO YOU HAVE A DRINK CONTAINING ALCOHOL: NEVER
HOW MANY STANDARD DRINKS CONTAINING ALCOHOL DO YOU HAVE ON A TYPICAL DAY: 1 OR 2

## 2024-01-21 ASSESSMENT — PAIN DESCRIPTION - LOCATION
LOCATION: ANKLE

## 2024-01-21 ASSESSMENT — PAIN DESCRIPTION - ORIENTATION
ORIENTATION: LEFT;LOWER
ORIENTATION: LEFT;LOWER
ORIENTATION: LEFT

## 2024-01-21 ASSESSMENT — PAIN SCALES - GENERAL
PAINLEVEL_OUTOF10: 8
PAINLEVEL_OUTOF10: 10
PAINLEVEL_OUTOF10: 9
PAINLEVEL_OUTOF10: 7

## 2024-01-21 ASSESSMENT — PAIN DESCRIPTION - DESCRIPTORS
DESCRIPTORS: DISCOMFORT;SHOOTING
DESCRIPTORS: CRUSHING
DESCRIPTORS: ACHING;SHOOTING
DESCRIPTORS: SHARP
DESCRIPTORS: SHOOTING

## 2024-01-21 ASSESSMENT — PAIN - FUNCTIONAL ASSESSMENT: PAIN_FUNCTIONAL_ASSESSMENT: 0-10

## 2024-01-21 NOTE — ED PROVIDER NOTES
Department of Emergency Medicine     Written by: Raulito Chambers MD  Patient Name: Shelia Mooney  Admit Date: 2024  2:20 PM  MRN: [id number]                   : 1945    HPI  Chief Complaint   Patient presents with    Ankle Injury     Slipped while taking out the trash. Left ankle injury. 50mcg Fentanyl given by EMS       Shelia Mooney is a 78 y.o. female that presents to the ED due to left ankle injury.  Injury occurred just prior to arrival.  She slipped while taking out the trash.  She sat on her foot in the snow for couple moments, she cannot stand up had to crawl back to her home.  EMS brought to the hospital.  Gave her 50 mcg of fentanyl prior to arrival.    Review of systems:  Pertinent positives and negatives mentioned in the HPI/MDM.    Physical Exam  Constitutional:       General: She is not in acute distress.     Appearance: Normal appearance.   Eyes:      Extraocular Movements: Extraocular movements intact.      Pupils: Pupils are equal, round, and reactive to light.   Cardiovascular:      Rate and Rhythm: Normal rate and regular rhythm.   Pulmonary:      Effort: Pulmonary effort is normal. No respiratory distress.   Abdominal:      Palpations: Abdomen is soft.      Tenderness: There is no abdominal tenderness.   Musculoskeletal:         General: Swelling, tenderness, deformity and signs of injury present.      Comments: Talar tilt test positive  DP and PT pulses intact  Sensation intact  Able to move toes  No foot bony tenderness  No proximal fibula tenderness, low suspicion for Maisonneuve fracture   Skin:     Findings: Bruising (Medial malleolus) present.      Comments: Skin tinting    Neurological:      Mental Status: She is alert and oriented to person, place, and time. Mental status is at baseline.          Chart review: The patient follows with OB/GYN for vaginal atrophy on 2019    Social determinants: family at bedside, social support available    Acute or chronic illness

## 2024-01-22 ENCOUNTER — HOSPITAL ENCOUNTER (INPATIENT)
Age: 79
LOS: 2 days | Discharge: NURSING, REHAB OR LONG-TERM CARE | DRG: 494 | End: 2024-01-24
Attending: EMERGENCY MEDICINE | Admitting: INTERNAL MEDICINE
Payer: MEDICARE

## 2024-01-22 ENCOUNTER — APPOINTMENT (OUTPATIENT)
Dept: GENERAL RADIOLOGY | Age: 79
DRG: 494 | End: 2024-01-22
Payer: MEDICARE

## 2024-01-22 VITALS
SYSTOLIC BLOOD PRESSURE: 124 MMHG | WEIGHT: 120 LBS | OXYGEN SATURATION: 99 % | DIASTOLIC BLOOD PRESSURE: 75 MMHG | RESPIRATION RATE: 18 BRPM | HEIGHT: 59 IN | TEMPERATURE: 97.5 F | HEART RATE: 102 BPM | BODY MASS INDEX: 24.19 KG/M2

## 2024-01-22 DIAGNOSIS — R26.2 AMBULATORY DYSFUNCTION: ICD-10-CM

## 2024-01-22 DIAGNOSIS — S82.842A CLOSED BIMALLEOLAR FRACTURE OF LEFT ANKLE, INITIAL ENCOUNTER: Primary | ICD-10-CM

## 2024-01-22 DIAGNOSIS — G89.18 POST-OP PAIN: ICD-10-CM

## 2024-01-22 LAB
ABO + RH BLD: NORMAL
ARM BAND NUMBER: NORMAL
BLOOD BANK SAMPLE EXPIRATION: NORMAL
BLOOD GROUP ANTIBODIES SERPL: NEGATIVE
INR PPP: 1.1
PROTHROMBIN TIME: 12 SEC (ref 9.3–12.4)

## 2024-01-22 PROCEDURE — 6370000000 HC RX 637 (ALT 250 FOR IP)

## 2024-01-22 PROCEDURE — 27840 TREAT ANKLE DISLOCATION: CPT

## 2024-01-22 PROCEDURE — 6370000000 HC RX 637 (ALT 250 FOR IP): Performed by: INTERNAL MEDICINE

## 2024-01-22 PROCEDURE — 93005 ELECTROCARDIOGRAM TRACING: CPT | Performed by: STUDENT IN AN ORGANIZED HEALTH CARE EDUCATION/TRAINING PROGRAM

## 2024-01-22 PROCEDURE — 73600 X-RAY EXAM OF ANKLE: CPT

## 2024-01-22 PROCEDURE — 2580000003 HC RX 258: Performed by: INTERNAL MEDICINE

## 2024-01-22 PROCEDURE — 86901 BLOOD TYPING SEROLOGIC RH(D): CPT

## 2024-01-22 PROCEDURE — 2500000003 HC RX 250 WO HCPCS: Performed by: EMERGENCY MEDICINE

## 2024-01-22 PROCEDURE — 6370000000 HC RX 637 (ALT 250 FOR IP): Performed by: STUDENT IN AN ORGANIZED HEALTH CARE EDUCATION/TRAINING PROGRAM

## 2024-01-22 PROCEDURE — 85610 PROTHROMBIN TIME: CPT

## 2024-01-22 PROCEDURE — 99285 EMERGENCY DEPT VISIT HI MDM: CPT

## 2024-01-22 PROCEDURE — 1200000000 HC SEMI PRIVATE

## 2024-01-22 PROCEDURE — 36415 COLL VENOUS BLD VENIPUNCTURE: CPT

## 2024-01-22 PROCEDURE — 6360000002 HC RX W HCPCS: Performed by: EMERGENCY MEDICINE

## 2024-01-22 PROCEDURE — 6360000002 HC RX W HCPCS

## 2024-01-22 PROCEDURE — 86850 RBC ANTIBODY SCREEN: CPT

## 2024-01-22 PROCEDURE — 71045 X-RAY EXAM CHEST 1 VIEW: CPT

## 2024-01-22 PROCEDURE — 86900 BLOOD TYPING SEROLOGIC ABO: CPT

## 2024-01-22 RX ORDER — FLUTICASONE PROPIONATE 50 MCG
1 SPRAY, SUSPENSION (ML) NASAL DAILY PRN
Status: DISCONTINUED | OUTPATIENT
Start: 2024-01-22 | End: 2024-01-24 | Stop reason: HOSPADM

## 2024-01-22 RX ORDER — AMLODIPINE BESYLATE 5 MG/1
5 TABLET ORAL DAILY
COMMUNITY

## 2024-01-22 RX ORDER — SODIUM CHLORIDE 9 MG/ML
INJECTION, SOLUTION INTRAVENOUS PRN
Status: DISCONTINUED | OUTPATIENT
Start: 2024-01-22 | End: 2024-01-24 | Stop reason: HOSPADM

## 2024-01-22 RX ORDER — POTASSIUM CHLORIDE 7.45 MG/ML
10 INJECTION INTRAVENOUS PRN
Status: DISCONTINUED | OUTPATIENT
Start: 2024-01-22 | End: 2024-01-24 | Stop reason: HOSPADM

## 2024-01-22 RX ORDER — LEVOTHYROXINE SODIUM 0.05 MG/1
100 TABLET ORAL DAILY
Status: DISCONTINUED | OUTPATIENT
Start: 2024-01-22 | End: 2024-01-24 | Stop reason: HOSPADM

## 2024-01-22 RX ORDER — MORPHINE SULFATE 2 MG/ML
2 INJECTION, SOLUTION INTRAMUSCULAR; INTRAVENOUS EVERY 4 HOURS PRN
Status: DISCONTINUED | OUTPATIENT
Start: 2024-01-22 | End: 2024-01-24

## 2024-01-22 RX ORDER — KETAMINE HCL IN NACL, ISO-OSM 100MG/10ML
50 SYRINGE (ML) INJECTION ONCE
Status: COMPLETED | OUTPATIENT
Start: 2024-01-22 | End: 2024-01-22

## 2024-01-22 RX ORDER — FENTANYL CITRATE 50 UG/ML
25 INJECTION, SOLUTION INTRAMUSCULAR; INTRAVENOUS ONCE
Status: COMPLETED | OUTPATIENT
Start: 2024-01-22 | End: 2024-01-22

## 2024-01-22 RX ORDER — POLYETHYLENE GLYCOL 3350 17 G/17G
17 POWDER, FOR SOLUTION ORAL DAILY PRN
Status: DISCONTINUED | OUTPATIENT
Start: 2024-01-22 | End: 2024-01-24 | Stop reason: HOSPADM

## 2024-01-22 RX ORDER — SODIUM CHLORIDE 0.9 % (FLUSH) 0.9 %
5-40 SYRINGE (ML) INJECTION EVERY 12 HOURS SCHEDULED
Status: DISCONTINUED | OUTPATIENT
Start: 2024-01-22 | End: 2024-01-24 | Stop reason: HOSPADM

## 2024-01-22 RX ORDER — ACETAMINOPHEN 325 MG/1
650 TABLET ORAL EVERY 6 HOURS PRN
Status: DISCONTINUED | OUTPATIENT
Start: 2024-01-22 | End: 2024-01-24 | Stop reason: HOSPADM

## 2024-01-22 RX ORDER — ASPIRIN 81 MG/1
81 TABLET, CHEWABLE ORAL DAILY
Status: DISCONTINUED | OUTPATIENT
Start: 2024-01-22 | End: 2024-01-23

## 2024-01-22 RX ORDER — SODIUM CHLORIDE 0.9 % (FLUSH) 0.9 %
5-40 SYRINGE (ML) INJECTION PRN
Status: DISCONTINUED | OUTPATIENT
Start: 2024-01-22 | End: 2024-01-24 | Stop reason: HOSPADM

## 2024-01-22 RX ORDER — AMLODIPINE BESYLATE 5 MG/1
5 TABLET ORAL DAILY
Status: DISCONTINUED | OUTPATIENT
Start: 2024-01-22 | End: 2024-01-24 | Stop reason: HOSPADM

## 2024-01-22 RX ORDER — ACETAMINOPHEN 650 MG/1
650 SUPPOSITORY RECTAL EVERY 6 HOURS PRN
Status: DISCONTINUED | OUTPATIENT
Start: 2024-01-22 | End: 2024-01-24 | Stop reason: HOSPADM

## 2024-01-22 RX ORDER — HYDROCHLOROTHIAZIDE 12.5 MG/1
12.5 TABLET ORAL DAILY
Status: ON HOLD | COMMUNITY
End: 2024-01-24 | Stop reason: HOSPADM

## 2024-01-22 RX ORDER — POTASSIUM CHLORIDE 20 MEQ/1
40 TABLET, EXTENDED RELEASE ORAL PRN
Status: DISCONTINUED | OUTPATIENT
Start: 2024-01-22 | End: 2024-01-24 | Stop reason: HOSPADM

## 2024-01-22 RX ORDER — ONDANSETRON 4 MG/1
4 TABLET, ORALLY DISINTEGRATING ORAL EVERY 8 HOURS PRN
Status: DISCONTINUED | OUTPATIENT
Start: 2024-01-22 | End: 2024-01-24 | Stop reason: HOSPADM

## 2024-01-22 RX ORDER — MAGNESIUM SULFATE IN WATER 40 MG/ML
2000 INJECTION, SOLUTION INTRAVENOUS PRN
Status: DISCONTINUED | OUTPATIENT
Start: 2024-01-22 | End: 2024-01-24 | Stop reason: HOSPADM

## 2024-01-22 RX ORDER — ALPRAZOLAM 0.25 MG/1
0.25 TABLET ORAL 3 TIMES DAILY PRN
Status: DISCONTINUED | OUTPATIENT
Start: 2024-01-22 | End: 2024-01-24 | Stop reason: HOSPADM

## 2024-01-22 RX ORDER — OXYCODONE HYDROCHLORIDE 5 MG/1
5 TABLET ORAL EVERY 4 HOURS PRN
Status: DISCONTINUED | OUTPATIENT
Start: 2024-01-22 | End: 2024-01-24 | Stop reason: HOSPADM

## 2024-01-22 RX ORDER — ONDANSETRON 2 MG/ML
4 INJECTION INTRAMUSCULAR; INTRAVENOUS EVERY 6 HOURS PRN
Status: DISCONTINUED | OUTPATIENT
Start: 2024-01-22 | End: 2024-01-24 | Stop reason: HOSPADM

## 2024-01-22 RX ORDER — ALPRAZOLAM 0.25 MG/1
0.25 TABLET ORAL 3 TIMES DAILY PRN
COMMUNITY

## 2024-01-22 RX ADMIN — FENTANYL CITRATE 25 MCG: 50 INJECTION INTRAMUSCULAR; INTRAVENOUS at 08:36

## 2024-01-22 RX ADMIN — OXYCODONE 5 MG: 5 TABLET ORAL at 22:36

## 2024-01-22 RX ADMIN — Medication 10 ML: at 22:36

## 2024-01-22 RX ADMIN — LORAZEPAM 0.5 MG: 0.5 TABLET ORAL at 00:12

## 2024-01-22 RX ADMIN — OXYCODONE 5 MG: 5 TABLET ORAL at 15:51

## 2024-01-22 RX ADMIN — LEVOTHYROXINE SODIUM 100 MCG: 0.1 TABLET ORAL at 09:47

## 2024-01-22 RX ADMIN — Medication 50 MG: at 06:42

## 2024-01-22 RX ADMIN — OXYCODONE AND ACETAMINOPHEN 1 TABLET: 5; 325 TABLET ORAL at 03:39

## 2024-01-22 RX ADMIN — POLYETHYLENE GLYCOL 3350 17 G: 17 POWDER, FOR SOLUTION ORAL at 22:36

## 2024-01-22 RX ADMIN — PROPOFOL 54.4 MG: 10 INJECTION, EMULSION INTRAVENOUS at 06:44

## 2024-01-22 RX ADMIN — Medication 10 ML: at 09:49

## 2024-01-22 RX ADMIN — ASPIRIN 81 MG 81 MG: 81 TABLET ORAL at 09:47

## 2024-01-22 RX ADMIN — MORPHINE SULFATE 2 MG: 2 INJECTION, SOLUTION INTRAMUSCULAR; INTRAVENOUS at 14:00

## 2024-01-22 ASSESSMENT — PAIN DESCRIPTION - ORIENTATION
ORIENTATION: LEFT

## 2024-01-22 ASSESSMENT — PAIN SCALES - GENERAL
PAINLEVEL_OUTOF10: 7
PAINLEVEL_OUTOF10: 4
PAINLEVEL_OUTOF10: 10
PAINLEVEL_OUTOF10: 8
PAINLEVEL_OUTOF10: 8
PAINLEVEL_OUTOF10: 10

## 2024-01-22 ASSESSMENT — PAIN DESCRIPTION - DESCRIPTORS
DESCRIPTORS: ACHING;DISCOMFORT;SORE
DESCRIPTORS: ACHING
DESCRIPTORS: THROBBING
DESCRIPTORS: ACHING

## 2024-01-22 ASSESSMENT — PAIN DESCRIPTION - LOCATION
LOCATION: ANKLE
LOCATION: ANKLE
LOCATION: LEG
LOCATION: FOOT
LOCATION: ANKLE

## 2024-01-22 ASSESSMENT — PAIN - FUNCTIONAL ASSESSMENT
PAIN_FUNCTIONAL_ASSESSMENT: NONE - DENIES PAIN
PAIN_FUNCTIONAL_ASSESSMENT: PREVENTS OR INTERFERES SOME ACTIVE ACTIVITIES AND ADLS
PAIN_FUNCTIONAL_ASSESSMENT: NONE - DENIES PAIN
PAIN_FUNCTIONAL_ASSESSMENT: 0-10

## 2024-01-22 ASSESSMENT — PAIN DESCRIPTION - PAIN TYPE: TYPE: ACUTE PAIN

## 2024-01-22 ASSESSMENT — PAIN DESCRIPTION - ONSET: ONSET: ON-GOING

## 2024-01-22 ASSESSMENT — LIFESTYLE VARIABLES: HOW OFTEN DO YOU HAVE A DRINK CONTAINING ALCOHOL: NEVER

## 2024-01-22 ASSESSMENT — PAIN DESCRIPTION - FREQUENCY: FREQUENCY: CONTINUOUS

## 2024-01-22 NOTE — ED NOTES
Patient family at bedside. Updated both family and patient on status of transfer and ETA for PAS. Patient and patient's family demonstrated understanding. Denies any further needs.

## 2024-01-22 NOTE — ED PROVIDER NOTES
Patient did have imaging done which showed fracture they attempted to reduce fracture they are unable to.  Patient was sent here for orthopedic evaluation.  Patient having no complaints any chest pain no difficulty breathing she reports no abdominal pain or vomiting diarrhea or cough.  Patient awake alert oriented x 3 heart exam normal lungs are clear abdomen soft nontender.  Patient has noted splint to the left lower extremity.  Patient able to move upper extremities out difficulty as well as right lower extremity limited range of motion secondary to splint on the left lower extremity.  Patient differential includes ankle fracture as well as open and closed ankle fracture as well as ankle dislocation.  Patient while in the emergency department did undergo conscious sedation.  Patient tolerated well patient was splinted by orthopedics here in the emergency department.  Patient's vital signs noted and stable.  Patient concerned about inability to use crutches as well as maneuvering around house.  Call was placed to orthopedics.  Plan will be to admit due to ambulatory dysfunction.  I did speak to orthopedics.  Patient from their standpoint does not need further evaluation on their part.  Patient was remedicated for pain here in the emergency room.  Patient due to her pain as well as patient does live alone at home.  Patient at risk for falling again.  Call was placed to on-call physician for admission.    Social Determinants affecting Dx or Tx: Patient is a past smoker    Chronic Condition hyperlipidemia and thyroid disease    Records Reviewed:   Patient was seen at [facility] closed fracture malleolus    Patient's lab work white count was 11 hemoglobin 13 patient sodium is 138 potassium 3.9 patient's x-ray showed bimalleolar fracture left ankle with complaint of subluxation.  Postreduction film showed bimalleolar fracture of the ankle with subluxation worsening of the subluxation component and displacement

## 2024-01-22 NOTE — ED NOTES
NarendraSercortez Newman DO to obtain admission orders for pain/activity/dvt prophylaxis according to Dr. Headley's nursing communication.

## 2024-01-22 NOTE — H&P
noted.  Mouth  mucosa without lesion.  Pharynx noninjected without exudate.  NECK:  Supple.  No JVD.  No thyromegaly.  No carotid bruits.  HEART:  Regular rate and rhythm without murmur.  LUNGS:  Clear to auscultation bilaterally.  ABDOMEN:  Positive bowel sounds, soft, nontender.  No rebound.  No  guarding.  No hepatosplenomegaly.  No masses.  BACK:  With increased thoracic kyphosis.  EXTREMITIES:  There is a splint on the left lower leg.  LYMPH NODES:  No adenopathy noted.  SKIN:  Without rash or lesion.    IMPRESSION:  Left ankle fracture, hypothyroidism, elevated cholesterol.    PLAN:  Admit.  PT, OT.  Activity per Orthopedics.   for  discharge planning.  Discharge plan home versus rehab as per the  patient's progress.        ROBYN COTE DO    D: 01/22/2024 9:08:45       T: 01/22/2024 9:11:12     [id number]  Job#: [id number]     Doc#: [id number]    CC:

## 2024-01-22 NOTE — ED NOTES
Called Dr. Headley's answering service to report patient requesting to complete treatment outpatient in a rehab facility. Dr. eHadley DO advised that this will be determined upon admission.

## 2024-01-22 NOTE — CONSULTS
Department of Orthopedic Surgery  Resident Consult Note          Reason for Consult:  Left Ankle Pain    HISTORY OF PRESENT ILLNESS:       Patient is a 78 y.o. female who presents with ankle pain after fall.  Patient reports when going outside to take out the trash, she slipped in the snow causing her to land on her left ankle.  Patient was taken to [facility] via EMS in which radiograph demonstrated ankle fracture.  Reduction was attempted via [facility] ED attending and staff however reduction was unsuccessful.  It was then decided upon the patient should be transferred to [facility] for another reduction attempt performed by orthopedic surgery.  Pt denies LOC or Hitting Head   Anticoagulation - none. The patient is community Ambulator without assist  - wheelchair, cane, and walker. Pt lives at home. Patient has a significant history of lupus (recently diagnosed, have not started treatment), Hyperlipidemia, thyroid disease . Previous Orthopedic Surgeon - no  Denies numbness/tingling/paresthesias.  Denies any other orthopedic complaints at this time.     Tobacco use: former smoker  Alcohol use: social drinker, wine at dinner daily  Illicit drug use: no history of illicit drug use    Past Medical History:        Diagnosis Date    Hyperlipidemia     Thyroid disease      Past Surgical History:        Procedure Laterality Date    APPENDECTOMY      FINGER TRIGGER RELEASE      HERNIA REPAIR      KNEE SURGERY Left 2016    LAPAROSCOPY  1997    OVARIAN CYST SURGERY  1962    RUPTURED OVARIAN CYST     Current Medications:   No current facility-administered medications for this encounter.  Allergies:  Patient has no known allergies.    Social History:   TOBACCO:   reports that she quit smoking about 27 years ago. She has never used smokeless tobacco.  ETOH:   reports current alcohol use.  DRUGS:   reports no history of drug use.  ACTIVITIES OF DAILY LIVING:    OCCUPATION:    Family History:

## 2024-01-22 NOTE — CARE COORDINATION
1/22/2024Social work transition of care planning  Pt is from home alone and had been independent. Pt pcp is Fritz and pharmacy is [facility] on [address]. Pt did not report any hx of snf or hhc. Explained sw role in transition of care planning. Pt would like to return home,but agreeable for snf if needed. Pt chose [facility]-referral made,terry list declined.  Electronically signed by MEKA Arriola on 1/22/2024 at 3:36 PM

## 2024-01-22 NOTE — ED NOTES
Patient's family added as contacts. Patient requesting note to be written stating that she gives permission to share information regarding her condition with her contacts.

## 2024-01-23 ENCOUNTER — ANESTHESIA (OUTPATIENT)
Dept: OPERATING ROOM | Age: 79
DRG: 494 | End: 2024-01-23
Payer: MEDICARE

## 2024-01-23 ENCOUNTER — APPOINTMENT (OUTPATIENT)
Dept: GENERAL RADIOLOGY | Age: 79
DRG: 494 | End: 2024-01-23
Payer: MEDICARE

## 2024-01-23 ENCOUNTER — ANESTHESIA EVENT (OUTPATIENT)
Dept: OPERATING ROOM | Age: 79
DRG: 494 | End: 2024-01-23
Payer: MEDICARE

## 2024-01-23 LAB
EKG ATRIAL RATE: 105 BPM
EKG P AXIS: 83 DEGREES
EKG P-R INTERVAL: 182 MS
EKG Q-T INTERVAL: 360 MS
EKG QRS DURATION: 138 MS
EKG QTC CALCULATION (BAZETT): 475 MS
EKG R AXIS: -79 DEGREES
EKG T AXIS: 54 DEGREES
EKG VENTRICULAR RATE: 105 BPM
PARTIAL THROMBOPLASTIN TIME: 31.9 SEC (ref 24.5–35.1)

## 2024-01-23 PROCEDURE — 6370000000 HC RX 637 (ALT 250 FOR IP)

## 2024-01-23 PROCEDURE — 0QHH35Z INSERTION OF EXTERNAL FIXATION DEVICE INTO LEFT TIBIA, PERCUTANEOUS APPROACH: ICD-10-PCS | Performed by: STUDENT IN AN ORGANIZED HEALTH CARE EDUCATION/TRAINING PROGRAM

## 2024-01-23 PROCEDURE — 36415 COLL VENOUS BLD VENIPUNCTURE: CPT

## 2024-01-23 PROCEDURE — C1713 ANCHOR/SCREW BN/BN,TIS/BN: HCPCS | Performed by: STUDENT IN AN ORGANIZED HEALTH CARE EDUCATION/TRAINING PROGRAM

## 2024-01-23 PROCEDURE — 2580000003 HC RX 258: Performed by: ORTHOPAEDIC SURGERY

## 2024-01-23 PROCEDURE — 2580000003 HC RX 258: Performed by: INTERNAL MEDICINE

## 2024-01-23 PROCEDURE — 3700000001 HC ADD 15 MINUTES (ANESTHESIA): Performed by: STUDENT IN AN ORGANIZED HEALTH CARE EDUCATION/TRAINING PROGRAM

## 2024-01-23 PROCEDURE — 6360000002 HC RX W HCPCS: Performed by: ANESTHESIOLOGY

## 2024-01-23 PROCEDURE — 2580000003 HC RX 258

## 2024-01-23 PROCEDURE — 3600000017 HC SURGERY HYBRID ADDL 15MIN: Performed by: STUDENT IN AN ORGANIZED HEALTH CARE EDUCATION/TRAINING PROGRAM

## 2024-01-23 PROCEDURE — 97165 OT EVAL LOW COMPLEX 30 MIN: CPT

## 2024-01-23 PROCEDURE — 64447 NJX AA&/STRD FEMORAL NRV IMG: CPT | Performed by: ANESTHESIOLOGY

## 2024-01-23 PROCEDURE — 6360000002 HC RX W HCPCS: Performed by: ORTHOPAEDIC SURGERY

## 2024-01-23 PROCEDURE — 97530 THERAPEUTIC ACTIVITIES: CPT

## 2024-01-23 PROCEDURE — 2709999900 HC NON-CHARGEABLE SUPPLY: Performed by: STUDENT IN AN ORGANIZED HEALTH CARE EDUCATION/TRAINING PROGRAM

## 2024-01-23 PROCEDURE — 97535 SELF CARE MNGMENT TRAINING: CPT

## 2024-01-23 PROCEDURE — 85730 THROMBOPLASTIN TIME PARTIAL: CPT

## 2024-01-23 PROCEDURE — 6370000000 HC RX 637 (ALT 250 FOR IP): Performed by: ORTHOPAEDIC SURGERY

## 2024-01-23 PROCEDURE — 6360000002 HC RX W HCPCS: Performed by: NURSE ANESTHETIST, CERTIFIED REGISTERED

## 2024-01-23 PROCEDURE — 64445 NJX AA&/STRD SCIATIC NRV IMG: CPT | Performed by: ANESTHESIOLOGY

## 2024-01-23 PROCEDURE — 1200000000 HC SEMI PRIVATE

## 2024-01-23 PROCEDURE — 6360000002 HC RX W HCPCS

## 2024-01-23 PROCEDURE — 3700000000 HC ANESTHESIA ATTENDED CARE: Performed by: STUDENT IN AN ORGANIZED HEALTH CARE EDUCATION/TRAINING PROGRAM

## 2024-01-23 PROCEDURE — 2720000010 HC SURG SUPPLY STERILE: Performed by: STUDENT IN AN ORGANIZED HEALTH CARE EDUCATION/TRAINING PROGRAM

## 2024-01-23 PROCEDURE — 97161 PT EVAL LOW COMPLEX 20 MIN: CPT

## 2024-01-23 PROCEDURE — 7100000000 HC PACU RECOVERY - FIRST 15 MIN: Performed by: STUDENT IN AN ORGANIZED HEALTH CARE EDUCATION/TRAINING PROGRAM

## 2024-01-23 PROCEDURE — 3600000007 HC SURGERY HYBRID BASE: Performed by: STUDENT IN AN ORGANIZED HEALTH CARE EDUCATION/TRAINING PROGRAM

## 2024-01-23 PROCEDURE — 7100000001 HC PACU RECOVERY - ADDTL 15 MIN: Performed by: STUDENT IN AN ORGANIZED HEALTH CARE EDUCATION/TRAINING PROGRAM

## 2024-01-23 PROCEDURE — 2580000003 HC RX 258: Performed by: NURSE ANESTHETIST, CERTIFIED REGISTERED

## 2024-01-23 RX ORDER — ROPIVACAINE HYDROCHLORIDE 5 MG/ML
INJECTION, SOLUTION EPIDURAL; INFILTRATION; PERINEURAL
Status: COMPLETED | OUTPATIENT
Start: 2024-01-23 | End: 2024-01-23

## 2024-01-23 RX ORDER — FENTANYL CITRATE 50 UG/ML
100 INJECTION, SOLUTION INTRAMUSCULAR; INTRAVENOUS ONCE
Status: COMPLETED | OUTPATIENT
Start: 2024-01-23 | End: 2024-01-23

## 2024-01-23 RX ORDER — OXYCODONE HYDROCHLORIDE 5 MG/1
5 TABLET ORAL EVERY 6 HOURS PRN
Qty: 20 TABLET | Refills: 0 | Status: SHIPPED | OUTPATIENT
Start: 2024-01-23 | End: 2024-01-28

## 2024-01-23 RX ORDER — ASPIRIN 325 MG
325 TABLET, DELAYED RELEASE (ENTERIC COATED) ORAL 2 TIMES DAILY
Status: DISCONTINUED | OUTPATIENT
Start: 2024-01-24 | End: 2024-01-24 | Stop reason: HOSPADM

## 2024-01-23 RX ORDER — ROPIVACAINE HYDROCHLORIDE 5 MG/ML
40 INJECTION, SOLUTION EPIDURAL; INFILTRATION; PERINEURAL ONCE
Status: COMPLETED | OUTPATIENT
Start: 2024-01-23 | End: 2024-01-23

## 2024-01-23 RX ORDER — ONDANSETRON 2 MG/ML
INJECTION INTRAMUSCULAR; INTRAVENOUS PRN
Status: DISCONTINUED | OUTPATIENT
Start: 2024-01-23 | End: 2024-01-23 | Stop reason: SDUPTHER

## 2024-01-23 RX ORDER — BISACODYL 5 MG/1
10 TABLET, DELAYED RELEASE ORAL DAILY PRN
Status: DISCONTINUED | OUTPATIENT
Start: 2024-01-23 | End: 2024-01-24 | Stop reason: HOSPADM

## 2024-01-23 RX ORDER — SODIUM CHLORIDE 9 MG/ML
INJECTION, SOLUTION INTRAVENOUS PRN
Status: DISCONTINUED | OUTPATIENT
Start: 2024-01-23 | End: 2024-01-24 | Stop reason: HOSPADM

## 2024-01-23 RX ORDER — ALBUTEROL SULFATE 2.5 MG/3ML
2.5 SOLUTION RESPIRATORY (INHALATION) EVERY 6 HOURS PRN
Status: DISCONTINUED | OUTPATIENT
Start: 2024-01-23 | End: 2024-01-24 | Stop reason: HOSPADM

## 2024-01-23 RX ORDER — ASPIRIN 325 MG
325 TABLET, DELAYED RELEASE (ENTERIC COATED) ORAL 2 TIMES DAILY
Qty: 56 TABLET | Refills: 0 | Status: SHIPPED | OUTPATIENT
Start: 2024-01-23 | End: 2024-02-20

## 2024-01-23 RX ORDER — SODIUM CHLORIDE 0.9 % (FLUSH) 0.9 %
5-40 SYRINGE (ML) INJECTION EVERY 12 HOURS SCHEDULED
Status: DISCONTINUED | OUTPATIENT
Start: 2024-01-23 | End: 2024-01-24 | Stop reason: HOSPADM

## 2024-01-23 RX ORDER — PROPOFOL 10 MG/ML
INJECTION, EMULSION INTRAVENOUS PRN
Status: DISCONTINUED | OUTPATIENT
Start: 2024-01-23 | End: 2024-01-23 | Stop reason: SDUPTHER

## 2024-01-23 RX ORDER — SODIUM CHLORIDE 9 MG/ML
INJECTION, SOLUTION INTRAVENOUS CONTINUOUS PRN
Status: DISCONTINUED | OUTPATIENT
Start: 2024-01-23 | End: 2024-01-23 | Stop reason: SDUPTHER

## 2024-01-23 RX ORDER — ALBUTEROL SULFATE 90 UG/1
2 AEROSOL, METERED RESPIRATORY (INHALATION) EVERY 6 HOURS PRN
Status: DISCONTINUED | OUTPATIENT
Start: 2024-01-23 | End: 2024-01-24 | Stop reason: HOSPADM

## 2024-01-23 RX ORDER — SODIUM CHLORIDE 0.9 % (FLUSH) 0.9 %
5-40 SYRINGE (ML) INJECTION PRN
Status: DISCONTINUED | OUTPATIENT
Start: 2024-01-23 | End: 2024-01-24 | Stop reason: HOSPADM

## 2024-01-23 RX ORDER — DOCUSATE SODIUM 100 MG/1
100 CAPSULE, LIQUID FILLED ORAL 2 TIMES DAILY
Status: DISCONTINUED | OUTPATIENT
Start: 2024-01-23 | End: 2024-01-24 | Stop reason: HOSPADM

## 2024-01-23 RX ORDER — FENTANYL CITRATE 50 UG/ML
INJECTION, SOLUTION INTRAMUSCULAR; INTRAVENOUS PRN
Status: DISCONTINUED | OUTPATIENT
Start: 2024-01-23 | End: 2024-01-23 | Stop reason: SDUPTHER

## 2024-01-23 RX ORDER — FENTANYL CITRATE 50 UG/ML
25 INJECTION, SOLUTION INTRAMUSCULAR; INTRAVENOUS ONCE
Status: COMPLETED | OUTPATIENT
Start: 2024-01-23 | End: 2024-01-23

## 2024-01-23 RX ORDER — LIDOCAINE HYDROCHLORIDE 20 MG/ML
INJECTION, SOLUTION INTRAVENOUS PRN
Status: DISCONTINUED | OUTPATIENT
Start: 2024-01-23 | End: 2024-01-23 | Stop reason: SDUPTHER

## 2024-01-23 RX ORDER — DEXAMETHASONE SODIUM PHOSPHATE 10 MG/ML
INJECTION, SOLUTION INTRAMUSCULAR; INTRAVENOUS
Status: COMPLETED | OUTPATIENT
Start: 2024-01-23 | End: 2024-01-23

## 2024-01-23 RX ORDER — DEXAMETHASONE SODIUM PHOSPHATE 10 MG/ML
INJECTION INTRAMUSCULAR; INTRAVENOUS PRN
Status: DISCONTINUED | OUTPATIENT
Start: 2024-01-23 | End: 2024-01-23 | Stop reason: SDUPTHER

## 2024-01-23 RX ORDER — MIDAZOLAM HYDROCHLORIDE 2 MG/2ML
2 INJECTION, SOLUTION INTRAMUSCULAR; INTRAVENOUS EVERY 10 MIN PRN
Status: DISCONTINUED | OUTPATIENT
Start: 2024-01-23 | End: 2024-01-23 | Stop reason: HOSPADM

## 2024-01-23 RX ORDER — DEXAMETHASONE SODIUM PHOSPHATE 10 MG/ML
4 INJECTION, SOLUTION INTRAMUSCULAR; INTRAVENOUS ONCE
Status: COMPLETED | OUTPATIENT
Start: 2024-01-23 | End: 2024-01-23

## 2024-01-23 RX ADMIN — ROPIVACAINE HYDROCHLORIDE 25 ML: 5 INJECTION, SOLUTION EPIDURAL; INFILTRATION; PERINEURAL at 10:04

## 2024-01-23 RX ADMIN — DEXAMETHASONE SODIUM PHOSPHATE 4 MG: 10 INJECTION, SOLUTION INTRAMUSCULAR; INTRAVENOUS at 09:58

## 2024-01-23 RX ADMIN — DOCUSATE SODIUM 100 MG: 100 CAPSULE, LIQUID FILLED ORAL at 18:13

## 2024-01-23 RX ADMIN — ROPIVACAINE HYDROCHLORIDE 15 ML: 5 INJECTION EPIDURAL; INFILTRATION; PERINEURAL at 10:05

## 2024-01-23 RX ADMIN — PROPOFOL 150 MG: 10 INJECTION, EMULSION INTRAVENOUS at 11:03

## 2024-01-23 RX ADMIN — DEXAMETHASONE SODIUM PHOSPHATE 4 MG: 10 INJECTION, SOLUTION INTRAMUSCULAR; INTRAVENOUS at 10:04

## 2024-01-23 RX ADMIN — MIDAZOLAM 2 MG: 1 INJECTION INTRAMUSCULAR; INTRAVENOUS at 09:56

## 2024-01-23 RX ADMIN — OXYCODONE 5 MG: 5 TABLET ORAL at 07:44

## 2024-01-23 RX ADMIN — CEFAZOLIN 2000 MG: 2 INJECTION, POWDER, FOR SOLUTION INTRAMUSCULAR; INTRAVENOUS at 10:53

## 2024-01-23 RX ADMIN — Medication 10 ML: at 08:38

## 2024-01-23 RX ADMIN — SODIUM CHLORIDE: 0.9 INJECTION, SOLUTION INTRAVENOUS at 10:53

## 2024-01-23 RX ADMIN — LIDOCAINE HYDROCHLORIDE 100 MG: 20 INJECTION, SOLUTION INTRAVENOUS at 11:03

## 2024-01-23 RX ADMIN — ONDANSETRON 4 MG: 2 INJECTION INTRAMUSCULAR; INTRAVENOUS at 11:34

## 2024-01-23 RX ADMIN — FENTANYL CITRATE 100 MCG: 50 INJECTION, SOLUTION INTRAMUSCULAR; INTRAVENOUS at 11:03

## 2024-01-23 RX ADMIN — ROPIVACAINE HYDROCHLORIDE 40 ML: 5 INJECTION EPIDURAL; INFILTRATION; PERINEURAL at 10:00

## 2024-01-23 RX ADMIN — WATER 1000 MG: 1 INJECTION INTRAMUSCULAR; INTRAVENOUS; SUBCUTANEOUS at 20:23

## 2024-01-23 RX ADMIN — Medication 10 ML: at 20:26

## 2024-01-23 RX ADMIN — FENTANYL CITRATE 50 MCG: 50 INJECTION INTRAMUSCULAR; INTRAVENOUS at 09:55

## 2024-01-23 RX ADMIN — DEXAMETHASONE SODIUM PHOSPHATE 10 MG: 10 INJECTION INTRAMUSCULAR; INTRAVENOUS at 11:20

## 2024-01-23 ASSESSMENT — PAIN DESCRIPTION - DESCRIPTORS: DESCRIPTORS: ACHING;DISCOMFORT;SORE

## 2024-01-23 ASSESSMENT — PAIN SCALES - GENERAL
PAINLEVEL_OUTOF10: 0
PAINLEVEL_OUTOF10: 6
PAINLEVEL_OUTOF10: 0
PAINLEVEL_OUTOF10: 8
PAINLEVEL_OUTOF10: 0
PAINLEVEL_OUTOF10: 0

## 2024-01-23 ASSESSMENT — PAIN DESCRIPTION - LOCATION: LOCATION: ANKLE

## 2024-01-23 ASSESSMENT — ENCOUNTER SYMPTOMS: SHORTNESS OF BREATH: 1

## 2024-01-23 NOTE — ANESTHESIA PROCEDURE NOTES
Peripheral Block    Patient location during procedure: pre-op  Reason for block: post-op pain management and at surgeon's request  Start time: 1/23/2024 10:04 AM  End time: 1/23/2024 10:08 AM  Staffing  Performed: anesthesiologist   Anesthesiologist: Erin Mann MD  Performed by: Erin Mann MD  Authorized by: Erin Mann MD    Preanesthetic Checklist  Completed: patient identified, IV checked, site marked, risks and benefits discussed, surgical/procedural consents, equipment checked, pre-op evaluation, timeout performed, anesthesia consent given, oxygen available, monitors applied/VS acknowledged, fire risk safety assessment completed and verbalized and blood product R/B/A discussed and consented  Peripheral Block   Patient position: supine  Prep: ChloraPrep  Provider prep: mask and sterile gloves  Patient monitoring: cardiac monitor, continuous pulse ox, frequent blood pressure checks and IV access  Block type: Sciatic  Popliteal  Laterality: left  Injection technique: single-shot  Guidance: ultrasound guided  Local infiltration: lidocaine  Infiltration strength: 1 %  Local infiltration: lidocaine  Dose: 3 mL    Needle   Needle type: insulated echogenic nerve stimulator needle   Needle gauge: 20 G  Needle localization: ultrasound guidance  Needle length: 10 cm  Assessment   Injection assessment: negative aspiration for heme, no paresthesia on injection, local visualized surrounding nerve on ultrasound and no intravascular symptoms  Paresthesia pain: none  Slow fractionated injection: yes  Hemodynamics: stable  Real-time US image taken/store: yes  Outcomes: patient tolerated procedure well and uncomplicated    Additional Notes  U/S [id number].  Time out performed.         Medications Administered  dexAMETHasone (DECADRON) (PF) 10 mg/mL injection - Other   4 mg - 1/23/2024 10:04:00 AM  ropivacaine (NAROPIN) injection 0.5% - Perineural   25 mL - 1/23/2024 10:04:00 AM

## 2024-01-23 NOTE — ANESTHESIA PROCEDURE NOTES
Peripheral Block    Patient location during procedure: pre-op  Reason for block: post-op pain management and at surgeon's request  Start time: 1/23/2024 10:05 AM  End time: 1/23/2024 10:07 AM  Staffing  Performed: anesthesiologist   Anesthesiologist: rEin Mann MD  Performed by: Erin Mann MD  Authorized by: Erin Mann MD    Preanesthetic Checklist  Completed: patient identified, IV checked, site marked, risks and benefits discussed, surgical/procedural consents, equipment checked, pre-op evaluation, timeout performed, anesthesia consent given, oxygen available, monitors applied/VS acknowledged, fire risk safety assessment completed and verbalized and blood product R/B/A discussed and consented  Peripheral Block   Patient position: supine  Prep: ChloraPrep  Provider prep: mask, sterile gloves and sterile gown  Patient monitoring: continuous pulse ox, frequent blood pressure checks, IV access and cardiac monitor  Block type: Femoral  Adductor canal  Laterality: left  Injection technique: single-shot  Guidance: ultrasound guided  Local infiltration: ropivacaine  Infiltration strength: 0.5 %  Local infiltration: ropivacaine  Dose: 1 mL    Needle   Needle type: insulated echogenic nerve stimulator needle   Needle gauge: 22 G  Needle localization: ultrasound guidance  Needle length: 10 cm  Assessment   Injection assessment: negative aspiration for heme, no paresthesia on injection, local visualized surrounding nerve on ultrasound and no intravascular symptoms  Paresthesia pain: none  Slow fractionated injection: yes  Hemodynamics: stable  Real-time US image taken/store: yes  Outcomes: uncomplicated and patient tolerated procedure well    Additional Notes  Time out performed.    Medications Administered  ropivacaine (NAROPIN) injection 0.5% - Perineural   15 mL - 1/23/2024 10:05:00 AM

## 2024-01-23 NOTE — ANESTHESIA POSTPROCEDURE EVALUATION
Department of Anesthesiology  Postprocedure Note    Patient: Shelia Mooney  MRN: [id number]  YOB: 1945  Date of evaluation: 1/23/2024    Procedure Summary       Date: 01/23/24 Room / Location: [facility]    Anesthesia Start: 1053 Anesthesia Stop: 1200    Procedure: EXTERNAL FIXATOR LEFT ANKLE (Left: Leg Lower) Diagnosis:       Closed fracture of left ankle, initial encounter      (Closed fracture of left ankle, initial encounter [S82.892A])    Surgeons: Derik Curry DO Responsible Provider: Erin Mann MD    Anesthesia Type: general ASA Status: 2            Anesthesia Type: No value filed.    Stephenie Phase I: Stephenie Score: 9    Stephenie Phase II:      Anesthesia Post Evaluation    Patient location during evaluation: PACU  Patient participation: complete - patient participated  Level of consciousness: awake and alert  Airway patency: patent  Nausea & Vomiting: no nausea and no vomiting  Cardiovascular status: blood pressure returned to baseline and hemodynamically stable  Respiratory status: acceptable and spontaneous ventilation  Hydration status: euvolemic  Multimodal analgesia pain management approach  Pain management: adequate    No notable events documented.

## 2024-01-23 NOTE — ANESTHESIA PRE PROCEDURE
Department of Anesthesiology  Preprocedure Note       Name:  Shelia Mooney   Age:  78 y.o.  :  1945                                          MRN:  [id number]         Date:  2024      Surgeon: Surgeon(s):  Derik Curry DO    Procedure: Procedure(s):  LEFT ANKLE OPEN REDUCTION INTERNAL FIXATION, SYNTHES **NEEDS BLOCK**    Medications prior to admission:   Prior to Admission medications    Medication Sig Start Date End Date Taking? Authorizing Provider   ALPRAZolam (XANAX) 0.25 MG tablet Take 1 tablet by mouth 3 times daily as needed for Anxiety. Max Daily Amount: 0.75 mg   Yes Angie Olmos MD   amLODIPine (NORVASC) 5 MG tablet Take 1 tablet by mouth daily   Yes Angie Olmos MD   hydroCHLOROthiazide (HYDRODIURIL) 12.5 MG tablet Take 1 tablet by mouth daily   Yes Angie Olmos MD   vitamin D (CHOLECALCIFEROL) 25 MCG (1000 UT) TABS tablet Take 1 tablet by mouth daily   Yes Angie Olmos MD   Coenzyme Q10 (CO Q 10) 100 MG CAPS Take 100 mg by mouth daily   Yes Angie Olmos MD   ibuprofen (ADVIL;MOTRIN) 800 MG tablet Take 1 tablet by mouth every 8 hours as needed for Pain 20   Oxana Thomas DO   fluticasone (FLONASE) 50 MCG/ACT nasal spray 1 spray by Nasal route daily as needed for Rhinitis    ProviderAngie MD   B Complex CAPS Take 1 capsule by mouth daily    Angie Olmos MD   Biotin 5000 MCG TABS Take 5,000 mcg by mouth daily    Angie Olmos MD   levothyroxine (SYNTHROID) 100 MCG tablet Take 1 tablet by mouth daily    Angie Olmos MD       Current medications:    Current Facility-Administered Medications   Medication Dose Route Frequency Provider Last Rate Last Admin    ceFAZolin (ANCEF) 2,000 mg in sterile water 20 mL IV syringe  2,000 mg IntraVENous On Call to OR Chase Marquez DO        fentaNYL (SUBLIMAZE) injection 25 mcg  25 mcg IntraVENous Once Erin Mann MD        Or    fentaNYL (SUBLIMAZE) injection 100

## 2024-01-23 NOTE — PLAN OF CARE
Problem: Skin/Tissue Integrity  Goal: Absence of new skin breakdown  Description: 1.  Monitor for areas of redness and/or skin breakdown  2.  Assess vascular access sites hourly  3.  Every 4-6 hours minimum:  Change oxygen saturation probe site  4.  Every 4-6 hours:  If on nasal continuous positive airway pressure, respiratory therapy assess nares and determine need for appliance change or resting period.  1/23/2024 0031 by Dong Anders RN  Outcome: Progressing  1/22/2024 1431 by Maeve Pepe RN  Outcome: Progressing     Problem: Safety - Adult  Goal: Free from fall injury  1/23/2024 0031 by Dong Anders RN  Outcome: Progressing  1/22/2024 1431 by Maeve Pepe RN  Outcome: Progressing     Problem: Discharge Planning  Goal: Discharge to home or other facility with appropriate resources  1/23/2024 0031 by Dong Anders RN  Outcome: Not Progressing  1/22/2024 1431 by Maeve Pepe RN  Outcome: Progressing     Problem: Pain  Goal: Verbalizes/displays adequate comfort level or baseline comfort level  1/23/2024 0031 by Dong Anders RN  Outcome: Progressing  1/22/2024 1431 by Maeve Pepe RN  Outcome: Progressing     Problem: ABCDS Injury Assessment  Goal: Absence of physical injury  Outcome: Progressing     Problem: Skin/Tissue Integrity - Adult  Goal: Skin integrity remains intact  Outcome: Progressing     Problem: Musculoskeletal - Adult  Goal: Return mobility to safest level of function  Outcome: Not Progressing  Goal: Maintain proper alignment of affected body part  Outcome: Progressing  Goal: Return ADL status to a safe level of function  Outcome: Not Progressing     Problem: Gastrointestinal - Adult  Goal: Maintains or returns to baseline bowel function  Outcome: Not Progressing     Problem: Genitourinary - Adult  Goal: Absence of urinary retention  Outcome: Progressing

## 2024-01-23 NOTE — BRIEF OP NOTE
Brief Postoperative Note      Patient: Shelia Mooney  YOB: 1945  MRN: [id number]    Date of Procedure: 1/23/2024    Pre-Op Diagnosis Codes:    1.  Closed left trimalleolar ankle fracture dislocation    Post-Op Diagnosis: Same       Procedure(s):  Application of multiplanar ankle spanning external fixator left  Post treatment of trimalleolar ankle fracture dislocation    Surgeon(s):  Derik Curry DO    Assistant:  Resident: Chinedu Oneil DO; Chase Marquez DO    Anesthesia: General    Estimated Blood Loss (mL): Minimal    Complications: None    Specimens:   * No specimens in log *    Implants:  * No implants in log *      Drains:   External Urinary Catheter (Active)   Site Assessment Intact 01/22/24 2301   Placement Replaced 01/22/24 2301   Catheter Care Catheter/Wick replaced 01/22/24 2301   Perineal Care Yes 01/22/24 2301   Suction 40 mmgHg continuous 01/22/24 2301   Urine Color Avis 01/22/24 2301   Urine Appearance Clear 01/22/24 2301       Findings: Significant fracture blisters on the medial lateral side, application of multiplanar external fixator with closed treatment of ankle fracture dislocation      Electronically signed by Derik Curry DO on 1/23/2024 at 11:51 AM

## 2024-01-23 NOTE — OP NOTE
stabilization, stiffness, and catastrophic anesthesia complications. They understand and wish to proceed.   Patient and family understand that this may be a staged procedure requiring another operation and a prolonged period of nonweightbearing.  Informed consent was obtained and signed and placed in chart preop holding.  My initials were placed on her left lower extremity.  Preoperative nerve block was performed department anesthesia.  Patient was then rolled to the operating room and general anesthesia was induced.  She was carefully transferred to the OR table and positioned supine.  All bony prominences well-padded.  Left leg was placed on a bone foam ramp.  Her splint was removed and it was noted to have significant fracture blisters on the medial lateral side of her ankle not amenable to definitive fixation.  Preoperative antibiotics were infused.  Left leg was prepped and draped in a standard sterile orthopedic fashion.  Appropriate timeout was performed confirming side and site of surgery.    A 6-hole clamp was used to fernando the skin on the anteromedial tibia.  2 stab incisions were created and using a drill guide we drilled and then placed 2 bicortical tibial half pins were external fixator.  These were attached to a 6-hole clamp without rigors.  A lateral x-ray was used to place the medial lateral calcaneal external fixation pin in the appropriate position.  This was cut.  The x-ray was then used to place 1/5 metatarsal and the first metatarsal half pin.  These had good purchase in the bone.  The foot pins were connected using bars and clamps creating a box.  The box was connected to the tibia using long rods and clamps.  At this point the fracture was manipulated and reduced using the external fixator blocks.  All connections were tightened.  Final x-rays were taken AP mortise and lateral confirming appropriate reestablishment of length alignment and rotation.  Foot was in neutral dorsiflexion.  Meticulous

## 2024-01-23 NOTE — CARE COORDINATION
1/23/2024Social work transition of care planning  Pt plan is to [facility],once medically stable(pending precert). Pt for sx today. Will need post op therapy for precert. Sw completed pasar and envelope.  Electronically signed by MEKA Arriola on 1/23/2024 at 8:50 AM

## 2024-01-23 NOTE — DISCHARGE INSTRUCTIONS
[facility] Department of Orthopedic Surgery  [address]   [phone]   [place] [address]    Dr. Derik Curry, DO    Orthopaedics Discharge Instructions   Weight bearing Status - Non-weight bearing - on left lower Extremity  Pain medication Per Prescriptions  Contact Office for Medication Refill- [phone]  Office can refill pain med every 7 days  If patient discharging to facility then pain control will be continued per facility physician  Ice to operative/injured site for 15-30 minutes of each hour for next 5 days    Recommend that you continue to ice the area 2-3 times per day after this   Elevate operative/injured limb on 2 pillows at home  Goal is to have limb above the heart if able  Continue DVT Prophylaxis (blood clot prevention) as Prescribed: aspirin   Wound care - Please keep external fixator/dressings clean and dry    Follow Up in Office in 1 week, please call office for appointment.     Call the office at [phone] for directions or with any questions.  Watch for these significant complications.  Call physician if they or any other problems occur:  Fever over 101°, redness, swelling or warmth at the operative site  Unrelieved nausea    Foul smelling or cloudy drainage at the operative site   Unrelieved pain    Blood soaked dressing. (Some oozing may be normal)     Numb, pale, blue, cold or tingling extremity          It is the Department of Orthopaedic Trauma's standard of practice that providers will de-escalate(wean) all patients from narcotic(opioid) medications during the post-operative period.   We provide multimodal pain control but opioid medications are tapered in all of our patients.  If patient requires referral to pain management for prolonged taper off of opioid pain medication we will facilitate this process.

## 2024-01-24 VITALS
HEART RATE: 100 BPM | OXYGEN SATURATION: 100 % | TEMPERATURE: 98.5 F | HEIGHT: 59 IN | WEIGHT: 120 LBS | SYSTOLIC BLOOD PRESSURE: 110 MMHG | DIASTOLIC BLOOD PRESSURE: 53 MMHG | BODY MASS INDEX: 24.19 KG/M2 | RESPIRATION RATE: 18 BRPM

## 2024-01-24 LAB
ANION GAP SERPL CALCULATED.3IONS-SCNC: 10 MMOL/L (ref 7–16)
BUN SERPL-MCNC: 12 MG/DL (ref 6–23)
CALCIUM SERPL-MCNC: 9 MG/DL (ref 8.6–10.2)
CHLORIDE SERPL-SCNC: 104 MMOL/L (ref 98–107)
CO2 SERPL-SCNC: 25 MMOL/L (ref 22–29)
CREAT SERPL-MCNC: 0.6 MG/DL (ref 0.5–1)
ERYTHROCYTE [DISTWIDTH] IN BLOOD BY AUTOMATED COUNT: 13.8 % (ref 11.5–15)
GFR SERPL CREATININE-BSD FRML MDRD: >60 ML/MIN/1.73M2
GLUCOSE SERPL-MCNC: 121 MG/DL (ref 74–99)
HCT VFR BLD AUTO: 33.2 % (ref 34–48)
HGB BLD-MCNC: 11 G/DL (ref 11.5–15.5)
MCH RBC QN AUTO: 29.8 PG (ref 26–35)
MCHC RBC AUTO-ENTMCNC: 33.1 G/DL (ref 32–34.5)
MCV RBC AUTO: 90 FL (ref 80–99.9)
PLATELET # BLD AUTO: 264 K/UL (ref 130–450)
PMV BLD AUTO: 10.3 FL (ref 7–12)
POTASSIUM SERPL-SCNC: 4.2 MMOL/L (ref 3.5–5)
RBC # BLD AUTO: 3.69 M/UL (ref 3.5–5.5)
SODIUM SERPL-SCNC: 139 MMOL/L (ref 132–146)
WBC OTHER # BLD: 18.7 K/UL (ref 4.5–11.5)

## 2024-01-24 PROCEDURE — 6370000000 HC RX 637 (ALT 250 FOR IP): Performed by: ORTHOPAEDIC SURGERY

## 2024-01-24 PROCEDURE — 2580000003 HC RX 258: Performed by: ANESTHESIOLOGY

## 2024-01-24 PROCEDURE — 97530 THERAPEUTIC ACTIVITIES: CPT

## 2024-01-24 PROCEDURE — 6370000000 HC RX 637 (ALT 250 FOR IP): Performed by: INTERNAL MEDICINE

## 2024-01-24 PROCEDURE — 85027 COMPLETE CBC AUTOMATED: CPT

## 2024-01-24 PROCEDURE — 36415 COLL VENOUS BLD VENIPUNCTURE: CPT

## 2024-01-24 PROCEDURE — 6360000002 HC RX W HCPCS: Performed by: ORTHOPAEDIC SURGERY

## 2024-01-24 PROCEDURE — 80048 BASIC METABOLIC PNL TOTAL CA: CPT

## 2024-01-24 PROCEDURE — 2580000003 HC RX 258: Performed by: ORTHOPAEDIC SURGERY

## 2024-01-24 PROCEDURE — 97535 SELF CARE MNGMENT TRAINING: CPT

## 2024-01-24 RX ORDER — BISACODYL 5 MG/1
10 TABLET, DELAYED RELEASE ORAL ONCE
Status: COMPLETED | OUTPATIENT
Start: 2024-01-24 | End: 2024-01-24

## 2024-01-24 RX ORDER — PSEUDOEPHEDRINE HCL 30 MG
100 TABLET ORAL 2 TIMES DAILY
COMMUNITY
Start: 2024-01-24

## 2024-01-24 RX ORDER — LACTULOSE 10 G/15ML
20 SOLUTION ORAL ONCE
Status: COMPLETED | OUTPATIENT
Start: 2024-01-24 | End: 2024-01-24

## 2024-01-24 RX ORDER — BISACODYL 5 MG/1
10 TABLET, DELAYED RELEASE ORAL DAILY PRN
COMMUNITY
Start: 2024-01-24

## 2024-01-24 RX ADMIN — DOCUSATE SODIUM 100 MG: 100 CAPSULE, LIQUID FILLED ORAL at 07:59

## 2024-01-24 RX ADMIN — OXYCODONE 5 MG: 5 TABLET ORAL at 10:54

## 2024-01-24 RX ADMIN — ASPIRIN 325 MG: 325 TABLET, COATED ORAL at 07:59

## 2024-01-24 RX ADMIN — OXYCODONE 5 MG: 5 TABLET ORAL at 06:21

## 2024-01-24 RX ADMIN — BISACODYL 10 MG: 5 TABLET, COATED ORAL at 08:01

## 2024-01-24 RX ADMIN — WATER 1000 MG: 1 INJECTION INTRAMUSCULAR; INTRAVENOUS; SUBCUTANEOUS at 03:53

## 2024-01-24 RX ADMIN — OXYCODONE 5 MG: 5 TABLET ORAL at 15:05

## 2024-01-24 RX ADMIN — LACTULOSE 20 G: 20 SOLUTION ORAL at 07:59

## 2024-01-24 RX ADMIN — MORPHINE SULFATE 2 MG: 2 INJECTION, SOLUTION INTRAMUSCULAR; INTRAVENOUS at 12:00

## 2024-01-24 RX ADMIN — OXYCODONE 5 MG: 5 TABLET ORAL at 19:53

## 2024-01-24 RX ADMIN — AMLODIPINE BESYLATE 5 MG: 5 TABLET ORAL at 08:01

## 2024-01-24 RX ADMIN — LEVOTHYROXINE SODIUM 100 MCG: 0.1 TABLET ORAL at 06:19

## 2024-01-24 RX ADMIN — MORPHINE SULFATE 2 MG: 2 INJECTION, SOLUTION INTRAMUSCULAR; INTRAVENOUS at 07:59

## 2024-01-24 RX ADMIN — SODIUM CHLORIDE, PRESERVATIVE FREE 10 ML: 5 INJECTION INTRAVENOUS at 07:59

## 2024-01-24 ASSESSMENT — PAIN SCALES - GENERAL
PAINLEVEL_OUTOF10: 8
PAINLEVEL_OUTOF10: 6
PAINLEVEL_OUTOF10: 8
PAINLEVEL_OUTOF10: 8
PAINLEVEL_OUTOF10: 10
PAINLEVEL_OUTOF10: 7
PAINLEVEL_OUTOF10: 7

## 2024-01-24 ASSESSMENT — PAIN DESCRIPTION - ORIENTATION
ORIENTATION: LEFT

## 2024-01-24 ASSESSMENT — PAIN - FUNCTIONAL ASSESSMENT
PAIN_FUNCTIONAL_ASSESSMENT: PREVENTS OR INTERFERES SOME ACTIVE ACTIVITIES AND ADLS
PAIN_FUNCTIONAL_ASSESSMENT: PREVENTS OR INTERFERES SOME ACTIVE ACTIVITIES AND ADLS

## 2024-01-24 ASSESSMENT — PAIN DESCRIPTION - DESCRIPTORS
DESCRIPTORS: ACHING
DESCRIPTORS: BURNING;ACHING
DESCRIPTORS: ACHING
DESCRIPTORS: ACHING;DISCOMFORT;SORE
DESCRIPTORS: BURNING;OTHER (COMMENT)

## 2024-01-24 ASSESSMENT — PAIN DESCRIPTION - LOCATION
LOCATION: LEG
LOCATION: LEG
LOCATION: ANKLE
LOCATION: ANKLE;LEG

## 2024-01-24 NOTE — PROGRESS NOTES
4 Eyes Skin Assessment     NAME:  Shelia Mooney  YOB: 1945  MEDICAL RECORD NUMBER:  [id number]    The patient is being assessed for  Admission    I agree that at least one RN has performed a thorough Head to Toe Skin Assessment on the patient. ALL assessment sites listed below have been assessed.      Areas assessed by both nurses:    Head, Face, Ears, Shoulders, Back, Chest, Arms, Elbows, Hands, Sacrum. Buttock, Coccyx, Ischium, and Legs. Feet and Heels coocyx is reddened but blanches        Does the Patient have a Wound? No noted wound(s)       Baldo Prevention initiated by RN: No  Wound Care Orders initiated by RN: No    Pressure Injury (Stage 3,4, Unstageable, DTI, NWPT, and Complex wounds) if present, place Wound referral order by RN under : No    New Ostomies, if present place, Ostomy referral order under : No     Nurse 1 eSignature: Electronically signed by Maeve Pepe RN on 1/22/24 at 2:22 PM EST    **SHARE this note so that the co-signing nurse can place an eSignature**    Nurse 2 eSignature: Electronically signed by Jennifer Wilcox RN on 1/22/24 at 2:59 PM EST  
Checked on pt again pt stated she is still in pain and that her pain is at an 8 communicated that to RN assigned to pt.   
Day 1 status post external fixator left ankle.  Plan is follow-up in my office in 1 week to plan definitive surgical management.  Okay to discharge from orthopedic surgery perspective            Derik Curry DO   Orthopaedic Surgery   1/24/2024  11:43 AM    Note: This report was completed using "[employer] voiced recognition software.  Every effort has been made to ensure accuracy; however, inadvertent computerized transcription errors may be present.        Department of Orthopedic Surgery   Progress Note    Patient seen and examined at bedside.  Patient easily awakened and oriented x 3 upon entering the room.  Patient states the pain is under control to their left lower extremity and they are denying any new numbness or tingling traveling down left lower extremity.    VITALS:  /66   Pulse (!) 104   Temp 98 °F (36.7 °C) (Temporal)   Resp 18   Ht 1.499 m (4' 11\")   Wt 54.4 kg (120 lb)   SpO2 93%   BMI 24.24 kg/m²     General: Awake alert oriented x 3    MUSCULOSKELETAL:   Left lower extremity:  Dressing C/D/I-ankle spanning external fixator in place  Compartments soft and compressible  + EHL DF and PF limited due to external fixator in place  +2/4 DP & PT pulses, Brisk Cap refill, Toes warm and perfused  Distal sensation grossly intact to Peroneals, Sural, Saphenous, and tibial nrs    CBC:   Lab Results   Component Value Date/Time    WBC 11.0 01/21/2024 03:33 PM    HGB 13.0 01/21/2024 03:33 PM    HCT 40.0 01/21/2024 03:33 PM     01/21/2024 03:33 PM     PT/INR:    Lab Results   Component Value Date/Time    PROTIME 12.0 01/22/2024 09:56 AM    INR 1.1 01/22/2024 09:56 AM       ASSESSMENT  S/P left ankle external fixator application on 1/23    PLAN    Plan discussed with patient all patient's questions answered to their satisfaction  Patient is to remain nonweightbearing left lower extremity  DVT prophylaxis aspirin 325 twice daily-to start today  PT/OT as able  Medical management appreciated  Pain 
Dr Oneil notified to look at patients pain control and adjust if he seems fit as pt is still taking the morphine and is concerned of pain control when she gets discharged   
Dr Oneil states no change to pain management   
Dr Oneil states to DC the morphine now and will address the oral pain management after surgery   
Met with pt, while in room pt stated she had pain in left leg and was asking for pain medication communicated that to nurse assigned to pt and pt had just received morphine 30 minutes prior. Gave pt ice bags to put around leg to see if pain subsides a little.   
OCCUPATIONAL THERAPY INITIAL EVALUATION    [facility] [address], [place]      Date:2024                                                Patient Name: Shelia Mooney  MRN: [id number]  : 1945  Room: [facility]     Evaluating OT:Marija Medrano, OTR/L   License #  [id number]       Referring Provider:     José Luis Hale DO       Specific Provider Orders/Date: OT evaluation & treatment        Diagnosis: Closed bimalleolar fracture of left ankle, initial encounter [S82.412A]  Ambulatory dysfunction [R26.2]      Pertinent Medical History:  has a past medical history of Hyperlipidemia and Thyroid disease.    Surgery: 24: Application of multiplanar ankle spanning external fixator left  Post treatment of trimalleolar ankle fracture dislocation    Past Surgical History:  has a past surgical history that includes hernia repair; Appendectomy; Finger trigger release; laparoscopy (); Ovarian cyst surgery (); and knee surgery (Left, 2016).       Precautions:  Fall Risk, strict NWB L LE, elevate L LE at rest      Assessment of current deficits    [x] Functional mobility            [x]ADLs           [x] Strength                  [x]Cognition    [x] Functional transfers          [x] IADLs         [x] Safety Awareness   [x]Endurance    [x] Fine Coordination                         [x] Balance      [] Vision/perception   [x]Sensation      []Gross Motor Coordination             [] ROM           [] Delirium                   [] Motor Control      OT PLAN OF CARE   OT POC based on physician orders, patient diagnosis and results of clinical assessment     Frequency/Duration: 2-4 days/wk for 2 weeks PRN   Specific OT Treatment Interventions to include:   Instruction/training on adapted ADL techniques and AE recommendations to increase functional independence within precautions  Training on energy conservation strategies, correct breathing 
Occupational Therapy  OT BEDSIDE TREATMENT NOTE   [facility]  [address] [place], [place]      Date:2024  Patient Name: Shelia Mooney  MRN: [id number]  : 1945  Room: [facility]     Evaluating OT:Marija Medrano, OTR/L   License #  [id number]        Referring Provider:     José Luis Hale DO        Specific Provider Orders/Date: OT evaluation & treatment        Diagnosis: Closed bimalleolar fracture of left ankle, initial encounter [S82.322A]  Ambulatory dysfunction [R26.2]      Pertinent Medical History:  has a past medical history of Hyperlipidemia and Thyroid disease.    Surgery: 24: Application of multiplanar ankle spanning external fixator left  Post treatment of trimalleolar ankle fracture dislocation    Past Surgical History:  has a past surgical history that includes hernia repair; Appendectomy; Finger trigger release; laparoscopy (); Ovarian cyst surgery (); and knee surgery (Left, 2016).       Precautions:  Fall Risk, strict NWB L LE, elevate L LE at rest      Assessment of current deficits    [x] Functional mobility            [x]ADLs           [x] Strength                  [x]Cognition    [x] Functional transfers          [x] IADLs         [x] Safety Awareness   [x]Endurance    [x] Fine Coordination                         [x] Balance      [] Vision/perception   [x]Sensation      []Gross Motor Coordination             [] ROM           [] Delirium                   [] Motor Control      OT PLAN OF CARE   OT POC based on physician orders, patient diagnosis and results of clinical assessment     Frequency/Duration: 2-4 days/wk for 2 weeks PRN   Specific OT Treatment Interventions to include:   Instruction/training on adapted ADL techniques and AE recommendations to increase functional independence within precautions  Training on energy conservation strategies, correct breathing pattern and techniques to improve 
Offered pt a bed bath, OT had already given her one.   
Physical Therapy  Physical Therapy Treatment Note       Name: Shelia Mooney  : 1945  MRN: [id number]      Date of Service: 2024    Evaluating PT:  Rachelle Lee PT, DPT [id number]    Room #:  5419/5419-A  Diagnosis:  Closed bimalleolar fracture of left ankle, initial encounter [S89.630A]  Ambulatory dysfunction [R26.2]  PMHx/PSHx:   has a past medical history of Hyperlipidemia and Thyroid disease.    Procedure/Surgery:    Application of multiplanar ankle spanning external fixator left  Post treatment of trimalleolar ankle fracture dislocation (24)  Precautions: Falls,  NWB L LE     SUBJECTIVE:    Pt lives alone in a 1 story home with 3 stairs to enter and 2 rail(s).  Bed is on 1st floor and bath is on 1st floor.  Pt ambulated with no AD and was independent PTA.    Equipment Owned: none   Equipment Needs:  WW    OBJECTIVE:   Initial Evaluation  Date: 24 Treatment  Date: 24 Short Term/ Long Term   Goals   AM-PAC 6 Clicks     Was pt agreeable to Eval/treatment? Yes  Yes     Does pt have pain? Denies pain d/t nerve block from surgery  Mod L LE pain     Bed Mobility  Rolling: Gurinder  Supine to sit: Gurinder  Sit to supine: Gurinder  Scooting: Gurinder Rolling min A  Supine to sit min A   Scooting min A  Rolling: Independent  Supine to sit: Independent  Sit to supine: Independent  Scooting: Independent   Transfers Sit to stand: Gurinder  Stand to sit: Gurinder  Stand pivot: Gurinder with WW Sit to stand min A   Stand to sit Gurinder  Stand pivot with ww with min A    Sit to stand: Independent  Stand to sit: Independent  Stand pivot: Modified Independent with WW   Ambulation    10 feet x2 reps with WW Gurinder 10 feet with ww with min A  150 feet with WW Modified Independent   Stair negotiation: ascended and descended  NT NT  4 steps with 2 rail Modified Independent    ROM BUE:  Defer to OT  BLE:  WFL     Strength BUE:  Defer to OT  BLE:  L LE deferred, R LE 4+/5   Improve 1 MMT   Balance Sitting EOB:  supervision 
Pt was very agitated and upset about pain medication being late stating that \"I am tired of being nice my pain medication needs to be given to me on time not late\" pain medication given at that time.   
IVPB (Peripheral Line), 10 mEq, IntraVENous, PRN, Shiv Headley,     magnesium sulfate 2000 mg in 50 mL IVPB premix, 2,000 mg, IntraVENous, PRN, Shiv Headley,     ondansetron (ZOFRAN-ODT) disintegrating tablet 4 mg, 4 mg, Oral, Q8H PRN **OR** ondansetron (ZOFRAN) injection 4 mg, 4 mg, IntraVENous, Q6H PRN, José Luis Hale,     polyethylene glycol (GLYCOLAX) packet 17 g, 17 g, Oral, Daily PRN, José Luis Hale, , 17 g at 01/22/24 2236    acetaminophen (TYLENOL) tablet 650 mg, 650 mg, Oral, Q6H PRN **OR** acetaminophen (TYLENOL) suppository 650 mg, 650 mg, Rectal, Q6H PRN, José Luis Hale,     morphine (PF) injection 2 mg, 2 mg, IntraVENous, Q4H PRN, José Luis Hale, , 2 mg at 01/22/24 1400    oxyCODONE (ROXICODONE) immediate release tablet 5 mg, 5 mg, Oral, Q4H PRN, José Luis Hale, , 5 mg at 01/24/24 0621    Objective:    /66   Pulse (!) 104   Temp 98 °F (36.7 °C) (Temporal)   Resp 18   Ht 1.499 m (4' 11\")   Wt 54.4 kg (120 lb)   SpO2 93%   BMI 24.24 kg/m²     Heart:  reg tachy  Lungs:  ctab  Abd: + bs soft nontender  Extrem:  ex fix device in place    CBC with Differential:    Lab Results   Component Value Date/Time    WBC 11.0 01/21/2024 03:33 PM    RBC 4.41 01/21/2024 03:33 PM    HGB 13.0 01/21/2024 03:33 PM    HCT 40.0 01/21/2024 03:33 PM     01/21/2024 03:33 PM    MCV 90.7 01/21/2024 03:33 PM    MCH 29.5 01/21/2024 03:33 PM    MCHC 32.5 01/21/2024 03:33 PM    RDW 13.8 01/21/2024 03:33 PM    LYMPHOPCT 14 01/21/2024 03:33 PM    MONOPCT 7 01/21/2024 03:33 PM    BASOPCT 1 01/21/2024 03:33 PM    MONOSABS 0.80 01/21/2024 03:33 PM    LYMPHSABS 1.50 01/21/2024 03:33 PM    EOSABS 0.18 01/21/2024 03:33 PM    BASOSABS 0.05 01/21/2024 03:33 PM     CMP:    Lab Results   Component Value Date/Time     01/21/2024 03:33 PM    K 3.9 01/21/2024 03:33 PM    K 5.2 12/20/2020 10:11 AM     01/21/2024 03:33 PM    CO2 26 01/21/2024 03:33 PM    BUN 17 01/21/2024 03:33 PM    
ankle fracture    Plan:  For OR germán Headley DO  8:18 AM  1/23/2024     
Moderate Complexity PT evaluation 66758  [] High Complexity PT evaluation 80737  [] PT Re-evaluation 10014  [] Gait training 58724 0 minutes  [] Manual therapy 14327 0 minutes  [x] Therapeutic activities 05629 8 minutes  [] Therapeutic exercises 61464 0 minutes  [] Neuromuscular reeducation 53269 0 minutes       Rachelle Lee PT, DPT  [id number]

## 2024-01-24 NOTE — CARE COORDINATION
1/24/2024Social work transition of care planning  Pt plan is to [facility],pending precert and medical stability. Pasar and envelope completed.  Electronically signed by MEKA Arriola on 1/24/2024 at 7:28 AM    Addendum:Sw notified that auth obtained(auth good for 48 hours). Charge Rn checking when pt is ready for dc.  Electronically signed by MEKA Arriola on 1/24/2024 at 1:32 PM    Addendum: Sw set up pas ambulance for 8 pm to [facility]. Pt aware that she may get a bill,if insurance does not cover trip. Modesta notifies charge, facility liaison and pt at bedside.  Electronically signed by MEKA Arriola on 1/24/2024 at 3:04 PM

## 2024-01-24 NOTE — PLAN OF CARE
Problem: Skin/Tissue Integrity  Goal: Absence of new skin breakdown  Description: 1.  Monitor for areas of redness and/or skin breakdown  2.  Assess vascular access sites hourly  3.  Every 4-6 hours minimum:  Change oxygen saturation probe site  4.  Every 4-6 hours:  If on nasal continuous positive airway pressure, respiratory therapy assess nares and determine need for appliance change or resting period.  Outcome: Progressing     Problem: Safety - Adult  Goal: Free from fall injury  Outcome: Progressing  Flowsheets (Taken 1/23/2024 2015)  Free From Fall Injury: Instruct family/caregiver on patient safety     Problem: Discharge Planning  Goal: Discharge to home or other facility with appropriate resources  Outcome: Progressing  Flowsheets (Taken 1/23/2024 2015)  Discharge to home or other facility with appropriate resources: Identify barriers to discharge with patient and caregiver     Problem: Pain  Goal: Verbalizes/displays adequate comfort level or baseline comfort level  Outcome: Progressing     Problem: ABCDS Injury Assessment  Goal: Absence of physical injury  Outcome: Progressing  Flowsheets (Taken 1/23/2024 2015)  Absence of Physical Injury: Implement safety measures based on patient assessment     Problem: Skin/Tissue Integrity - Adult  Goal: Skin integrity remains intact  Outcome: Progressing  Flowsheets (Taken 1/23/2024 2015)  Skin Integrity Remains Intact: Monitor for areas of redness and/or skin breakdown     Problem: Musculoskeletal - Adult  Goal: Return mobility to safest level of function  Outcome: Progressing  Goal: Maintain proper alignment of affected body part  Outcome: Progressing  Goal: Return ADL status to a safe level of function  Outcome: Progressing     Problem: Gastrointestinal - Adult  Goal: Maintains or returns to baseline bowel function  Outcome: Progressing     Problem: Genitourinary - Adult  Goal: Absence of urinary retention  Outcome: Progressing

## 2024-01-24 NOTE — DISCHARGE INSTR - COC
Address:  Phone:  Fax:    Dialysis Facility (if applicable)   Name:  Address:  Dialysis Schedule:  Phone:  Fax:    / signature: {Esignature:[id number]}    PHYSICIAN SECTION    Prognosis: {Prognosis:4064457024}    Condition at Discharge: { Patient Condition:250048615}    Rehab Potential (if transferring to Rehab): {Prognosis:9927659357}    Recommended Labs or Other Treatments After Discharge: ***    Physician Certification: I certify the above information and transfer of Shelia Mooney  is necessary for the continuing treatment of the diagnosis listed and that she requires {Admit to Appropriate Level of Care:06228} for {GREATER/LESS:956534735} 30 days.     Update Admission H&P: {CHP DME Changes in HandP:133767954}    PHYSICIAN SIGNATURE:  {Esignature:[id number]}

## 2024-01-31 ENCOUNTER — PREP FOR PROCEDURE (OUTPATIENT)
Dept: ORTHOPEDIC SURGERY | Age: 79
End: 2024-01-31

## 2024-01-31 ENCOUNTER — TELEPHONE (OUTPATIENT)
Dept: ORTHOPEDIC SURGERY | Age: 79
End: 2024-01-31

## 2024-01-31 ENCOUNTER — OFFICE VISIT (OUTPATIENT)
Dept: ORTHOPEDIC SURGERY | Age: 79
End: 2024-01-31

## 2024-01-31 DIAGNOSIS — S82.852D CLOSED TRIMALLEOLAR FRACTURE OF LEFT ANKLE WITH ROUTINE HEALING, SUBSEQUENT ENCOUNTER: Primary | ICD-10-CM

## 2024-01-31 PROBLEM — S82.852A CLOSED DISPLACED TRIMALLEOLAR FRACTURE OF LEFT ANKLE: Status: ACTIVE | Noted: 2024-01-31

## 2024-01-31 PROCEDURE — 99024 POSTOP FOLLOW-UP VISIT: CPT | Performed by: STUDENT IN AN ORGANIZED HEALTH CARE EDUCATION/TRAINING PROGRAM

## 2024-01-31 RX ORDER — OXYCODONE HYDROCHLORIDE AND ACETAMINOPHEN 5; 325 MG/1; MG/1
1 TABLET ORAL EVERY 6 HOURS PRN
Qty: 20 TABLET | Refills: 0 | Status: SHIPPED | OUTPATIENT
Start: 2024-01-31 | End: 2024-02-05

## 2024-01-31 NOTE — PROGRESS NOTES
Surgical Procedure: Removal External Fixator LLE /  ORIF Left ankle trimalleolar fracture  ICD Code: S82.852A  CPT Code: 44663  Vendor: [employer]  Needs: C-Arm   Anesthesia: General   Block: Block  Date: 2/6/2024  Time: 9:30 a.m.  Place: [facility]   Status: OPT  Surgeon: Derik Curry D.O.      Medications reviewed with the patient / holding the following medications: Hold Aspirin morning of surgery.  D/C all NSAIDS and OTC vitamins, minerals and supplements, last doses 1/31/2024     Pre Op Instructions reviewed with the patient.   Informed patient: A hospital nurse will contact her with instructions for the day of surgery. The patient expresses understanding and is in agreement with the plan.      Post Op Appointment: Date: 2/21/2024 Time: 9:10 a.m.  [place] [facility]

## 2024-01-31 NOTE — PATIENT INSTRUCTIONS
INSTRUCTIONS FOR FACILITY      Weight Bearing: Non-weight bearing left lower extremity. Use assistive devices when needed.  Keep leg elevated above the level of heart is much as possible        Pain control: per facility physician    Incisional Care: Daily dressing changes.  Dry dressing changes over her wound on the lateral ankle.  Pin site care.  Again keep ankle elevated is much as possible    DVT Prophylaxis: Continue with aspirin as prescribed    Follow up:     Future Appointments   Date Time Provider Department Center   2/21/2024  9:10 AM Derik Curry DO [facility]       Call office with any questions or concerns.                      Procedure: Removal External Fixator Left Lower Extremity, ORIF Left Ankle Fracture    You will not need medical clearance prior to surgery.       Your surgery is scheduled for 2/6/2024 at 9:30 a.m.  with Dr. Derik Curry DO at the main [place] on [facility] in [place] .  You will need to report to Preop area  that morning at 7:30 a.m. .   All surgery start times are subject to change. You will be notified by office and/or PAT department if your surgery time changes. If you need to cancel/reschedule your surgery for any reason, please contact [facility] at [phone] ASAP.   You are having Outpatient surgery, but plan for 1-2 nights in the hospital for recovery if needed.  Preadmission Testing (PAT) department at [facility] will contact you with further details regarding pre-operative blood work, where to park and enter the hospital, your medication list, etc. If you have any surgery related questions please contact PAT at [facility] at [phone].  Nothing to eat or drink after midnight the night before surgery.  You may take a pain pill and any other medicine PAT instructs you to take with small sip of water if needed.  Continue with ice and elevation to reduce swelling  No weight bearing left lower extremity, use assistive

## 2024-01-31 NOTE — TELEPHONE ENCOUNTER
Prior Authorization BC/SCARLTET Howard      Procedure:  Removal External Fixator LLE / ORIF left ankle trimalleolar fracture  Procedure Code: 71201, 88233  Diagnosis Code:  S82.852A  Date:  2/6/2024  Facility:  [facility]  Status: OPT  Physician: Derik Curry D.O.  Call Reference Number:  [id number]  Auth Number: No pre-cert is required for OPT surgery   Contact: Elham GOMEZ 1/31/2024  1:25 p.m.

## 2024-01-31 NOTE — PROGRESS NOTES
Follow Up Post Operative Visit     Surgery: Application of ankle spanning external fixator left ankle  Date: 1/23/2024    Subjective:    Shelia Mooney is here for follow up visit s/p above procedure.  She is doing very well.  Her pain has been well-controlled.  She has been compliant with her nonweightbearing.  She has been taking aspirin and try to keep her leg elevated.  She is living in a nursing home.    Controlled Substances Monitoring:        Physical Exam:    No data recorded    General: Alert and oriented x3, no acute distress  Cardiovascular/pulmonary: No labored breathing, peripheral perfusion intact  Musculoskeletal:    Left ankle: External fixator in place clean and dry.  Pin sites without drainage.  Lateral blisters starting to heal.  Skin swelling decreasing positive wrinkle sign.  Sensation intact in sural saphenous superficial peroneal deep peroneal tibial nerve distribution.      Imaging: Fluoroscopic images from the hospital reviewed.  She is a trimalleolar ankle fracture in good alignment    Assessment and Plan: 1 week status post application of ankle spanning external fixator for trimalleolar ankle fracture  -I think her soft tissue swelling is amenable to definitive surgery.  We need to get this external fixator off and get these fractures fixed.  She does have some blisters which are healing.  This does slightly increase her infection risk but I think next Tuesday is a reasonable goal for surgery.  Will get her scheduled for outpatient open reduction internal fixation of her left ankle with removal of external fixator. Risks, benefits, and alternatives were discussed with the patient and their family. Risks include but are not limited to infection, blood loss, damage to neurovascular and musculoskeletal structures, malunion, nonunion, symptomatic hardware, need for further surgery, possible arthritis despite surgical stabilization, stiffness, and catastrophic anesthesia complications. They

## 2024-02-01 ENCOUNTER — TELEPHONE (OUTPATIENT)
Dept: ORTHOPEDIC SURGERY | Age: 79
End: 2024-02-01

## 2024-02-01 NOTE — PROGRESS NOTES
[facility] [place]   PRE-ADMISSION TESTING GENERAL INSTRUCTIONS  PAT Phone Number: [phone]      GENERAL INSTRUCTIONS:    [x] Antibacterial Soap Shower Night before and/or AM of surgery.  [x] Do not wear makeup, lotions, powders, deodorant.   [x] Nothing by mouth after midnight; including gum, candy, mints, or water.  [x] You may brush your teeth, gargle, but do NOT swallow water.   [x] No tobacco products, illegal drugs, or alcohol within 24 hours of your surgery.  [x] Jewelry or valuables should not be brought to the hospital. All body and/or tongue piercing's must be removed prior to arriving to hospital. No contact lens or hair pins.   [x] Bring insurance card and photo ID.     PARKING INSTRUCTIONS:     [x] ARRIVAL DATE & TIME: 2/6 @ 0750am  [x] Times are subject to change. We will contact you the business day before surgery if that were to occur.  [x] Enter into the [facility] Entrance. Two people may accompany you. Masks are not required.  [x] Parking Lot \"I\" is where you will park. It is located on the corner of [address] and [address]. The entrance is on [address].   Only one vehicle - per patient, is permitted in parking lot.   Upon entering the parking lot, a voucher ticket will print.    MEDICATION INSTRUCTIONS:    [x] Bring a complete list of your medications, please write the last time you took the medicine, give this list to the nurse in Pre-Op.    [x] Take only the following medications the morning of surgery with 1-2 ounces of water:    - Xanax (if needed), amlodipine    [x] Stop all herbal supplements and vitamins 5 days before surgery. Stop NSAIDS 7 days before surgery.   -  Last dose of Vitamin D 2/1    [x] Follow physician instructions regarding any blood thinners you may be taking.   - Last dose of Aspirin 2/4    WHAT TO EXPECT:    [x] The day of surgery you will be greeted and checked in by the [facility] . In addition, you will be

## 2024-02-01 NOTE — TELEPHONE ENCOUNTER
Janneth TOMAS called.  Needs D/C date for  mg bid.  Per Dr. Curry, D/C Aspirin 1 day prior to surgery.  Last Dose 2/4/2024.       Janneth will notify nursing [place] of above.

## 2024-02-05 ENCOUNTER — ANESTHESIA EVENT (OUTPATIENT)
Dept: OPERATING ROOM | Age: 79
End: 2024-02-05
Payer: MEDICARE

## 2024-02-06 ENCOUNTER — ANESTHESIA (OUTPATIENT)
Dept: OPERATING ROOM | Age: 79
End: 2024-02-06
Payer: MEDICARE

## 2024-02-06 ENCOUNTER — HOSPITAL ENCOUNTER (OUTPATIENT)
Age: 79
Setting detail: OUTPATIENT SURGERY
Discharge: OTHER INSTITUTION | End: 2024-02-06
Attending: STUDENT IN AN ORGANIZED HEALTH CARE EDUCATION/TRAINING PROGRAM | Admitting: STUDENT IN AN ORGANIZED HEALTH CARE EDUCATION/TRAINING PROGRAM
Payer: MEDICARE

## 2024-02-06 ENCOUNTER — APPOINTMENT (OUTPATIENT)
Dept: GENERAL RADIOLOGY | Age: 79
End: 2024-02-06
Attending: STUDENT IN AN ORGANIZED HEALTH CARE EDUCATION/TRAINING PROGRAM
Payer: MEDICARE

## 2024-02-06 ENCOUNTER — HOSPITAL ENCOUNTER (OUTPATIENT)
Dept: GENERAL RADIOLOGY | Age: 79
Discharge: HOME | End: 2024-02-08
Attending: STUDENT IN AN ORGANIZED HEALTH CARE EDUCATION/TRAINING PROGRAM
Payer: MEDICARE

## 2024-02-06 VITALS
BODY MASS INDEX: 24.19 KG/M2 | RESPIRATION RATE: 14 BRPM | HEIGHT: 59 IN | OXYGEN SATURATION: 95 % | SYSTOLIC BLOOD PRESSURE: 103 MMHG | HEART RATE: 96 BPM | WEIGHT: 120 LBS | TEMPERATURE: 97 F | DIASTOLIC BLOOD PRESSURE: 79 MMHG

## 2024-02-06 DIAGNOSIS — T14.8XXA FRACTURE: ICD-10-CM

## 2024-02-06 DIAGNOSIS — S82.852A CLOSED DISPLACED TRIMALLEOLAR FRACTURE OF LEFT ANKLE, INITIAL ENCOUNTER: Primary | ICD-10-CM

## 2024-02-06 PROCEDURE — 64447 NJX AA&/STRD FEMORAL NRV IMG: CPT | Performed by: STUDENT IN AN ORGANIZED HEALTH CARE EDUCATION/TRAINING PROGRAM

## 2024-02-06 PROCEDURE — 6360000002 HC RX W HCPCS: Performed by: STUDENT IN AN ORGANIZED HEALTH CARE EDUCATION/TRAINING PROGRAM

## 2024-02-06 PROCEDURE — 7100000011 HC PHASE II RECOVERY - ADDTL 15 MIN: Performed by: STUDENT IN AN ORGANIZED HEALTH CARE EDUCATION/TRAINING PROGRAM

## 2024-02-06 PROCEDURE — 7100000001 HC PACU RECOVERY - ADDTL 15 MIN: Performed by: STUDENT IN AN ORGANIZED HEALTH CARE EDUCATION/TRAINING PROGRAM

## 2024-02-06 PROCEDURE — 2500000003 HC RX 250 WO HCPCS: Performed by: STUDENT IN AN ORGANIZED HEALTH CARE EDUCATION/TRAINING PROGRAM

## 2024-02-06 PROCEDURE — 2720000010 HC SURG SUPPLY STERILE: Performed by: STUDENT IN AN ORGANIZED HEALTH CARE EDUCATION/TRAINING PROGRAM

## 2024-02-06 PROCEDURE — C1769 GUIDE WIRE: HCPCS | Performed by: STUDENT IN AN ORGANIZED HEALTH CARE EDUCATION/TRAINING PROGRAM

## 2024-02-06 PROCEDURE — 3700000000 HC ANESTHESIA ATTENDED CARE: Performed by: STUDENT IN AN ORGANIZED HEALTH CARE EDUCATION/TRAINING PROGRAM

## 2024-02-06 PROCEDURE — 7100000010 HC PHASE II RECOVERY - FIRST 15 MIN: Performed by: STUDENT IN AN ORGANIZED HEALTH CARE EDUCATION/TRAINING PROGRAM

## 2024-02-06 PROCEDURE — 6360000002 HC RX W HCPCS

## 2024-02-06 PROCEDURE — C1713 ANCHOR/SCREW BN/BN,TIS/BN: HCPCS | Performed by: STUDENT IN AN ORGANIZED HEALTH CARE EDUCATION/TRAINING PROGRAM

## 2024-02-06 PROCEDURE — 2709999900 HC NON-CHARGEABLE SUPPLY: Performed by: STUDENT IN AN ORGANIZED HEALTH CARE EDUCATION/TRAINING PROGRAM

## 2024-02-06 PROCEDURE — 3600000003 HC SURGERY LEVEL 3 BASE: Performed by: STUDENT IN AN ORGANIZED HEALTH CARE EDUCATION/TRAINING PROGRAM

## 2024-02-06 PROCEDURE — 2500000003 HC RX 250 WO HCPCS

## 2024-02-06 PROCEDURE — 73502 X-RAY EXAM HIP UNI 2-3 VIEWS: CPT

## 2024-02-06 PROCEDURE — 3600000013 HC SURGERY LEVEL 3 ADDTL 15MIN: Performed by: STUDENT IN AN ORGANIZED HEALTH CARE EDUCATION/TRAINING PROGRAM

## 2024-02-06 PROCEDURE — 3700000001 HC ADD 15 MINUTES (ANESTHESIA): Performed by: STUDENT IN AN ORGANIZED HEALTH CARE EDUCATION/TRAINING PROGRAM

## 2024-02-06 PROCEDURE — 2580000003 HC RX 258: Performed by: STUDENT IN AN ORGANIZED HEALTH CARE EDUCATION/TRAINING PROGRAM

## 2024-02-06 PROCEDURE — 7100000000 HC PACU RECOVERY - FIRST 15 MIN: Performed by: STUDENT IN AN ORGANIZED HEALTH CARE EDUCATION/TRAINING PROGRAM

## 2024-02-06 DEVICE — SCREW BNE L14MM DIA3.5MM CORT S STL ST LOK FULL THRD: Type: IMPLANTABLE DEVICE | Site: LEG | Status: FUNCTIONAL

## 2024-02-06 DEVICE — SCREW BNE L14MM DIA2.7MM CORT S STL ST LOK FULL THRD T8: Type: IMPLANTABLE DEVICE | Site: LEG | Status: FUNCTIONAL

## 2024-02-06 DEVICE — PLATE BNE L99MM 5 H ST L LAT DST FIBULAR S STL LOK COMPR: Type: IMPLANTABLE DEVICE | Site: LEG | Status: FUNCTIONAL

## 2024-02-06 DEVICE — SCREW BNE L12MM DIA2.7MM CORT S STL ST LOK FULL THRD T8: Type: IMPLANTABLE DEVICE | Site: LEG | Status: FUNCTIONAL

## 2024-02-06 DEVICE — SCREW BNE L36MM DIA4MM S STL CANN SHT 1/3 THRD SM HEX SOCK: Type: IMPLANTABLE DEVICE | Site: LEG | Status: FUNCTIONAL

## 2024-02-06 DEVICE — SCREW BNE L46MM DIA3.5MM CORT S STL ST NONCANNULATED LOK: Type: IMPLANTABLE DEVICE | Site: LEG | Status: FUNCTIONAL

## 2024-02-06 DEVICE — SCREW BNE L48MM DIA3.5MM CORT S STL ST NONCANNULATED LOK: Type: IMPLANTABLE DEVICE | Site: LEG | Status: FUNCTIONAL

## 2024-02-06 DEVICE — SCREW BNE L12MM DIA3.5MM CORT S STL ST NONCANNULATED LOK: Type: IMPLANTABLE DEVICE | Site: LEG | Status: FUNCTIONAL

## 2024-02-06 DEVICE — SCREW BNE L14MM DIA3.5MM CORT S STL ST NONCANNULATED LOK: Type: IMPLANTABLE DEVICE | Site: LEG | Status: FUNCTIONAL

## 2024-02-06 RX ORDER — SODIUM CHLORIDE 0.9 % (FLUSH) 0.9 %
5-40 SYRINGE (ML) INJECTION EVERY 12 HOURS SCHEDULED
Status: DISCONTINUED | OUTPATIENT
Start: 2024-02-06 | End: 2024-02-06 | Stop reason: HOSPADM

## 2024-02-06 RX ORDER — PROPOFOL 10 MG/ML
INJECTION, EMULSION INTRAVENOUS PRN
Status: DISCONTINUED | OUTPATIENT
Start: 2024-02-06 | End: 2024-02-06 | Stop reason: SDUPTHER

## 2024-02-06 RX ORDER — ONDANSETRON 2 MG/ML
INJECTION INTRAMUSCULAR; INTRAVENOUS PRN
Status: DISCONTINUED | OUTPATIENT
Start: 2024-02-06 | End: 2024-02-06 | Stop reason: SDUPTHER

## 2024-02-06 RX ORDER — SODIUM CHLORIDE 9 MG/ML
INJECTION, SOLUTION INTRAVENOUS CONTINUOUS
Status: DISCONTINUED | OUTPATIENT
Start: 2024-02-06 | End: 2024-02-06 | Stop reason: HOSPADM

## 2024-02-06 RX ORDER — LIDOCAINE HYDROCHLORIDE 20 MG/ML
INJECTION, SOLUTION INTRAVENOUS PRN
Status: DISCONTINUED | OUTPATIENT
Start: 2024-02-06 | End: 2024-02-06 | Stop reason: SDUPTHER

## 2024-02-06 RX ORDER — ROPIVACAINE HYDROCHLORIDE 5 MG/ML
INJECTION, SOLUTION EPIDURAL; INFILTRATION; PERINEURAL
Status: COMPLETED | OUTPATIENT
Start: 2024-02-06 | End: 2024-02-06

## 2024-02-06 RX ORDER — HYDROMORPHONE HYDROCHLORIDE 1 MG/ML
0.25 INJECTION, SOLUTION INTRAMUSCULAR; INTRAVENOUS; SUBCUTANEOUS EVERY 5 MIN PRN
Status: DISCONTINUED | OUTPATIENT
Start: 2024-02-06 | End: 2024-02-06 | Stop reason: HOSPADM

## 2024-02-06 RX ORDER — SODIUM CHLORIDE 0.9 % (FLUSH) 0.9 %
5-40 SYRINGE (ML) INJECTION PRN
Status: DISCONTINUED | OUTPATIENT
Start: 2024-02-06 | End: 2024-02-06 | Stop reason: HOSPADM

## 2024-02-06 RX ORDER — SODIUM CHLORIDE 9 MG/ML
INJECTION, SOLUTION INTRAVENOUS PRN
Status: DISCONTINUED | OUTPATIENT
Start: 2024-02-06 | End: 2024-02-06 | Stop reason: HOSPADM

## 2024-02-06 RX ORDER — FENTANYL CITRATE 50 UG/ML
100 INJECTION, SOLUTION INTRAMUSCULAR; INTRAVENOUS ONCE
Status: COMPLETED | OUTPATIENT
Start: 2024-02-06 | End: 2024-02-06

## 2024-02-06 RX ORDER — ROPIVACAINE HYDROCHLORIDE 5 MG/ML
30 INJECTION, SOLUTION EPIDURAL; INFILTRATION; PERINEURAL ONCE
Status: COMPLETED | OUTPATIENT
Start: 2024-02-06 | End: 2024-02-06

## 2024-02-06 RX ORDER — OXYCODONE HYDROCHLORIDE 5 MG/1
5 TABLET ORAL EVERY 4 HOURS PRN
Status: ON HOLD | COMMUNITY
End: 2024-02-06 | Stop reason: HOSPADM

## 2024-02-06 RX ORDER — DEXAMETHASONE SODIUM PHOSPHATE 10 MG/ML
INJECTION INTRAMUSCULAR; INTRAVENOUS PRN
Status: DISCONTINUED | OUTPATIENT
Start: 2024-02-06 | End: 2024-02-06 | Stop reason: SDUPTHER

## 2024-02-06 RX ORDER — KETOROLAC TROMETHAMINE 30 MG/ML
30 INJECTION, SOLUTION INTRAMUSCULAR; INTRAVENOUS
Status: DISCONTINUED | OUTPATIENT
Start: 2024-02-06 | End: 2024-02-06 | Stop reason: HOSPADM

## 2024-02-06 RX ORDER — PROCHLORPERAZINE EDISYLATE 5 MG/ML
5 INJECTION INTRAMUSCULAR; INTRAVENOUS
Status: DISCONTINUED | OUTPATIENT
Start: 2024-02-06 | End: 2024-02-06 | Stop reason: HOSPADM

## 2024-02-06 RX ORDER — KETOROLAC TROMETHAMINE 30 MG/ML
INJECTION, SOLUTION INTRAMUSCULAR; INTRAVENOUS
Status: DISCONTINUED
Start: 2024-02-06 | End: 2024-02-06 | Stop reason: HOSPADM

## 2024-02-06 RX ORDER — HYDROMORPHONE HYDROCHLORIDE 1 MG/ML
0.5 INJECTION, SOLUTION INTRAMUSCULAR; INTRAVENOUS; SUBCUTANEOUS EVERY 5 MIN PRN
Status: COMPLETED | OUTPATIENT
Start: 2024-02-06 | End: 2024-02-06

## 2024-02-06 RX ORDER — ROCURONIUM BROMIDE 10 MG/ML
INJECTION, SOLUTION INTRAVENOUS PRN
Status: DISCONTINUED | OUTPATIENT
Start: 2024-02-06 | End: 2024-02-06 | Stop reason: SDUPTHER

## 2024-02-06 RX ORDER — ASPIRIN 325 MG
325 TABLET ORAL 2 TIMES DAILY
Qty: 30 TABLET | Refills: 3 | Status: SHIPPED | OUTPATIENT
Start: 2024-02-06 | End: 2024-04-06

## 2024-02-06 RX ORDER — OXYCODONE HYDROCHLORIDE 5 MG/1
5 TABLET ORAL EVERY 6 HOURS PRN
Qty: 28 TABLET | Refills: 0 | Status: SHIPPED | OUTPATIENT
Start: 2024-02-06 | End: 2024-02-13

## 2024-02-06 RX ADMIN — PHENYLEPHRINE HYDROCHLORIDE 100 MCG: 10 INJECTION INTRAVENOUS at 10:43

## 2024-02-06 RX ADMIN — PHENYLEPHRINE HYDROCHLORIDE 100 MCG: 10 INJECTION INTRAVENOUS at 10:58

## 2024-02-06 RX ADMIN — ROCURONIUM BROMIDE 40 MG: 10 INJECTION, SOLUTION INTRAVENOUS at 10:05

## 2024-02-06 RX ADMIN — HYDROMORPHONE HYDROCHLORIDE 0.5 MG: 1 INJECTION, SOLUTION INTRAMUSCULAR; INTRAVENOUS; SUBCUTANEOUS at 12:00

## 2024-02-06 RX ADMIN — HYDROMORPHONE HYDROCHLORIDE 0.5 MG: 1 INJECTION, SOLUTION INTRAMUSCULAR; INTRAVENOUS; SUBCUTANEOUS at 12:05

## 2024-02-06 RX ADMIN — SUGAMMADEX 150 MG: 100 INJECTION, SOLUTION INTRAVENOUS at 11:02

## 2024-02-06 RX ADMIN — ROPIVACAINE HYDROCHLORIDE 30 ML: 5 INJECTION, SOLUTION EPIDURAL; INFILTRATION; PERINEURAL at 08:45

## 2024-02-06 RX ADMIN — PHENYLEPHRINE HYDROCHLORIDE 100 MCG: 10 INJECTION INTRAVENOUS at 10:49

## 2024-02-06 RX ADMIN — ROPIVACAINE HYDROCHLORIDE 30 ML: 5 INJECTION EPIDURAL; INFILTRATION; PERINEURAL at 08:45

## 2024-02-06 RX ADMIN — CEFAZOLIN 2000 MG: 2 INJECTION, POWDER, FOR SOLUTION INTRAMUSCULAR; INTRAVENOUS at 10:15

## 2024-02-06 RX ADMIN — LIDOCAINE HYDROCHLORIDE 60 MG: 20 INJECTION, SOLUTION INTRAVENOUS at 10:05

## 2024-02-06 RX ADMIN — ONDANSETRON 4 MG: 2 INJECTION INTRAMUSCULAR; INTRAVENOUS at 11:00

## 2024-02-06 RX ADMIN — DEXAMETHASONE SODIUM PHOSPHATE 10 MG: 10 INJECTION INTRAMUSCULAR; INTRAVENOUS at 10:20

## 2024-02-06 RX ADMIN — SODIUM CHLORIDE: 9 INJECTION, SOLUTION INTRAVENOUS at 10:00

## 2024-02-06 RX ADMIN — SODIUM CHLORIDE: 9 INJECTION, SOLUTION INTRAVENOUS at 11:23

## 2024-02-06 RX ADMIN — PROPOFOL 150 MG: 10 INJECTION, EMULSION INTRAVENOUS at 10:05

## 2024-02-06 RX ADMIN — FENTANYL CITRATE 100 MCG: 50 INJECTION INTRAMUSCULAR; INTRAVENOUS at 08:43

## 2024-02-06 ASSESSMENT — PAIN SCALES - GENERAL
PAINLEVEL_OUTOF10: 0
PAINLEVEL_OUTOF10: 9
PAINLEVEL_OUTOF10: 3

## 2024-02-06 ASSESSMENT — PAIN DESCRIPTION - ONSET: ONSET: AWAKENED FROM SLEEP

## 2024-02-06 ASSESSMENT — PAIN DESCRIPTION - FREQUENCY
FREQUENCY: INTERMITTENT
FREQUENCY: CONTINUOUS

## 2024-02-06 ASSESSMENT — PAIN DESCRIPTION - PAIN TYPE
TYPE: ACUTE PAIN
TYPE: ACUTE PAIN

## 2024-02-06 ASSESSMENT — PAIN DESCRIPTION - DESCRIPTORS
DESCRIPTORS: DULL
DESCRIPTORS: THROBBING;DISCOMFORT

## 2024-02-06 ASSESSMENT — PAIN DESCRIPTION - LOCATION
LOCATION: HIP;GROIN
LOCATION: HIP;GROIN

## 2024-02-06 ASSESSMENT — PAIN - FUNCTIONAL ASSESSMENT
PAIN_FUNCTIONAL_ASSESSMENT: NONE - DENIES PAIN
PAIN_FUNCTIONAL_ASSESSMENT: NONE - DENIES PAIN

## 2024-02-06 ASSESSMENT — ENCOUNTER SYMPTOMS: SHORTNESS OF BREATH: 1

## 2024-02-06 ASSESSMENT — PAIN DESCRIPTION - ORIENTATION
ORIENTATION: RIGHT
ORIENTATION: RIGHT

## 2024-02-06 NOTE — OP NOTE
Operative Note      Patient: Shelia Mooney  YOB: 1945  MRN: [id number]    Date of Procedure: 2/6/2024    Pre-Op Diagnosis Codes:     * Closed displaced trimalleolar fracture of left ankle [S82.852A]    Post-Op Diagnosis: Same       Procedure(s):  Removal of external fixator left lower extremity  Open reduction and fixation left trimalleolar ankle fracture dislocation without fixation of posterior malleolus  Open duction internal fixation left ankle syndesmosis    Surgeon(s):  Derik Curry DO    Assistant:   Resident: Robert Ovalle DO    Anesthesia: General    Estimated Blood Loss (mL): Minimal    Complications: None    Specimens:   * No specimens in log *    Implants:  Implant Name Type Inv. Item Serial No.  Lot No. LRB No. Used Action   SCREW BNE L14MM DIA2.7MM HELADIO S STL ST NACHO FULL THRD T8 - [id number]  SCREW BNE L14MM DIA2.7MM HELADIO S STL ST NACH[place] FULL THRD T8  [employer] [place]  Left 2 Implanted   SCREW BNE L12MM DIA2.7MM HELADIO S STL ST N[place] FULL THRD T8 - [id number]  SCREW BNE L12MM DIA2.7MM HELADIO S STL ST N[place] FULL THRD T8  DEPUY [employer] [place]  Left 3 Implanted   PLATE BNE L99MM 5 H ST L LAT DST FIBULAR S STL NACHO COMPR - [id number]  PLATE BNE L99MM 5 H ST L LAT DST FIBULAR S STL NAHCO COMPR  DEPUY SYN[employer]ES [place]  Left 1 Implanted   SCREW BNE L12MM DIA3.5MM HELADIO S STL ST NONCANNULATED NACHO - [id number]  SCREW BNE L12MM DIA3.5MM HELADIO S STL ST NONCANNULATED NACHO  DEPUY [employer] [place]-  Left 1 Implanted   SCREW BNE L14MM DIA3.5MM HELADIO S STL ST NONCANNULATED NACHO - [id number]  SCREW BNE L14MM DIA3.5MM HELADIO S STL ST NONCANNULATED NACHO  DEPUY [employer] [place]-  Left 2 Implanted   SCREW BNE L14MM DIA3.5MM HELADIO S STL ST NACH[place] FULL THRD - [id number]  SCREW BNE L14MM DIA3.5MM HELADIO S STL ST NACH[place] FULL THRD  DEPUY [employer] [place]-  Left 1 Implanted   SCREW BNE L36MM DIA4MM S STL MANUEL SHT 1/3 THRD SM HEX SOCK - [id number]  SCREW BNE L36MM DIA4MM S STL MANUEL SHT 1/3 THRD SM HEX SOCK  DEPUY SYNTHES [facility]-

## 2024-02-06 NOTE — ANESTHESIA PROCEDURE NOTES
Peripheral Block    Patient location during procedure: pre-op  Reason for block: post-op pain management and at surgeon's request  Start time: 2/6/2024 8:45 AM  End time: 2/6/2024 8:50 AM  Staffing  Performed by: Devika Bose MD  Authorized by: Devika Bose MD    Preanesthetic Checklist  Completed: patient identified, IV checked, site marked, risks and benefits discussed, surgical/procedural consents, equipment checked, pre-op evaluation, timeout performed, anesthesia consent given, oxygen available and monitors applied/VS acknowledged  Peripheral Block   Patient position: supine  Prep: ChloraPrep  Provider prep: mask and sterile gloves  Patient monitoring: cardiac monitor, continuous pulse ox, frequent blood pressure checks and IV access  Block type: Femoral and Sciatic  Laterality: left  Injection technique: single-shot  Guidance: ultrasound guided  Local infiltration: lidocaine  Infiltration strength: 1 %  Local infiltration: lidocaine    Needle   Needle type: combined needle/nerve stimulator   Needle gauge: 20 G  Needle localization: ultrasound guidance  Needle length: 10 cm  Assessment   Injection assessment: negative aspiration for heme, no paresthesia on injection and local visualized surrounding nerve on ultrasound  Slow fractionated injection: yes  Hemodynamics: stable  Real-time US image taken/store: yes    Additional Notes  Popliteal nerve block Used 17 cc Ropivacaine 0.5% and Adductor 13 cc Ropivacaine 0.5%      Medications Administered  ropivacaine (NAROPIN) injection 0.5% - Perineural   30 mL - 2/6/2024 8:45:00 AM

## 2024-02-06 NOTE — H&P
UNCLES; AUNTS    Diabetes Maternal Aunt         + INSULIN    Breast Cancer Maternal Aunt     Diabetes Maternal Aunt         + INSULIN       REVIEW OF SYSTEMS:       Constitutional:  Negative for weight loss, fevers, chills, fatigue  Cardiovascular: Negative for chest pain, palpitations  Pulmonary: Negative for shortness of breath, labored breathing, cough  GI: negative for abdominal pain, nausea, vomitting   MSK: per HPI  Skin: negative for rash, open wounds    All other systems reviewed and are negative         PHYSICAL EXAM:      Vitals:    02/06/24 0808   BP: (!) 146/68   Pulse: 96   Resp: 20   Temp: 97.1 °F (36.2 °C)   TempSrc: Temporal   SpO2: 97%   Weight: 54.4 kg (120 lb)   Height: 1.499 m (4' 11\")       Height: [unfilled]  Weight: [unfilled]  BMI:  Body mass index is 24.24 kg/m².    General: The patient is alert and oriented x 3, appears to be stated age and in no distress.   HEENT: head is normocephalic, atraumatic.  EOMI.   Neck: supple, trachea midline, no thyromegaly   Cardiovascular: peripheral pulses palpable.  Normal Capillary refill   Respiratory: breathing unlabored, chest expansion symmetric   GI: abdomen NT/ND.  No masses   Skin: no rash, no open wounds, no erythema  Psych: normal affect; mood stable  MSK:  External fixator in place left ankle clean and dry.  Pin sites without drainage.  Dressing removed.  Skin supple with good wrinkle sign.  Blisters have healed without any wounds or drainage.  Sensation is intact distally in her foot.  She is able to wiggle her toes.  Foot is warm well-perfused        DATA:      CBC:   Lab Results   Component Value Date/Time    WBC 18.7 01/24/2024 06:59 AM    RBC 3.69 01/24/2024 06:59 AM    HGB 11.0 01/24/2024 06:59 AM    HCT 33.2 01/24/2024 06:59 AM    MCV 90.0 01/24/2024 06:59 AM    MCH 29.8 01/24/2024 06:59 AM    MCHC 33.1 01/24/2024 06:59 AM    RDW 13.8 01/24/2024 06:59 AM     01/24/2024 06:59 AM    MPV 10.3 01/24/2024 06:59 AM     BMP:    Lab  2021

## 2024-02-06 NOTE — BRIEF OP NOTE
Brief Postoperative Note      Patient: Shelia Mooney  YOB: 1945  MRN: [id number]    Date of Procedure: 2/6/2024    Pre-Op Diagnosis Codes:     * Closed displaced trimalleolar fracture of left ankle [S82.852A]    Post-Op Diagnosis: Same       Procedure(s):  Removal of external fixator left lower extremity  Open reduction and fixation left trimalleolar ankle fracture dislocation without fixation of posterior malleolus  Open duction internal fixation left ankle syndesmosis    Surgeon(s):  Derik Curry DO    Assistant:  Resident: Robert Ovalle DO    Anesthesia: General    Estimated Blood Loss (mL): Minimal    Complications: None    Specimens:   * No specimens in log *    Implants:  Implant Name Type Inv. Item Serial No.  Lot No. LRB No. Used Action   SCREW BNE L14MM DIA2.7MM HELADIO S STL ST NACHO FULL THRD T8 - [id number]  SCREW BNE L14MM DIA2.7MM HELADIO S STL ST N[place] FULL THRD T8  DEPU[employer] [place]  Left 2 Implanted   SCREW BNE L12MM DIA2.7MM HELADIO S STL ST [place] FULL THRD T8 - [id number]  SCREW BNE L12MM DIA2.7MM HELADIO S STL ST NACH[place] FULL THRD T8  DEPU[employer] [place]  Left 3 Implanted   PLATE BNE L99MM 5 H ST L LAT DST FIBULAR S STL NACHO COMPR - [id number]  PLATE BNE L99MM 5 H ST L LAT DST FIBULAR S STL NACHO COMPR  DEPUY [employer] [place]  Left 1 Implanted   SCREW BNE L12MM DIA3.5MM HELADIO S STL ST NONCANNULATED NACHO - [id number]  SCREW BNE L12MM DIA3.5MM HELADIO S STL ST NONCANNULATED NACHO  DEPU[employer] [place]  Left 1 Implanted   SCREW BNE L14MM DIA3.5MM HELADIO S STL ST NONCANNULATED NACHO - [id number]  SCREW BNE L14MM DIA3.5MM HELADIO S STL ST NONCANNULATED NACHO  DEPU[employer] [place]  Left 2 Implanted   SCREW BNE L14MM DIA3.5MM HELADIO S STL ST NACHO FULL THRD - [id number]  SCREW BNE L14MM DIA3.5MM HELADIO S STL ST NACHO FULL THRD  DEPUY [employer] [place]  Left 1 Implanted   SCREW BNE L36MM DIA4MM S STL MANUEL SHT 1/3 THRD SM HEX SOCK - [id number]  SCREW BNE L36MM DIA4MM S CHIARA JACKSON SHT 1/3 THRD SM HEX SOCK  DEPUY SYNTHES

## 2024-02-06 NOTE — PROGRESS NOTES
Dr. Curry updated on patient's c/o pain right hip/groin area. Patient states that this pain is new. Patient crying at times. Dr. Curry bedside assessing patient. Xrays ordered.

## 2024-02-06 NOTE — ANESTHESIA POSTPROCEDURE EVALUATION
Department of Anesthesiology  Postprocedure Note    Patient: Shelia Mooney  MRN: [id number]  YOB: 1945  Date of evaluation: 2/6/2024    Procedure Summary       Date: 02/06/24 Room / Location: [address]    Anesthesia Start: 1001 Anesthesia Stop: 1132    Procedure: REMOVAL EXTERNAL FIXATOR LEFT LOWER EXTREMITY, OPEN REDUCTION INTERNAL FIXATION LEFT ANKLE TRIMALLEOLAR FRATURE (Left: Leg Lower) Diagnosis:       Closed displaced trimalleolar fracture of left ankle      (Closed displaced trimalleolar fracture of left ankle [S82.852A])    Surgeons: eDrik Curry DO Responsible Provider:     Anesthesia Type: General, Regional ASA Status: 3            Anesthesia Type: General, Regional    Stephenie Phase I: Stephenie Score: 10    Stephenie Phase II: Stephenie Score: 10    Anesthesia Post Evaluation    Patient location during evaluation: PACU  Patient participation: complete - patient participated  Level of consciousness: awake  Pain score: 2  Airway patency: patent  Nausea & Vomiting: no nausea  Cardiovascular status: hemodynamically stable  Respiratory status: acceptable  Hydration status: stable  Multimodal analgesia pain management approach    No notable events documented.

## 2024-02-06 NOTE — PROGRESS NOTES
Right hip x-rays reviewed.  No fractures or dislocations noted.  Okay to discharge from orthopedic perspective              Derik Curry DO   Orthopaedic Surgery   2/6/2024  1:11 PM    Note: This report was completed using computerCodemasters voiced recognition software.  Every effort has been made to ensure accuracy; however, inadvertent computerized transcription errors may be present.

## 2024-02-06 NOTE — PROGRESS NOTES
Dr. Curry evaluated xrays of right hip/pelvis. He ordered Toradol 30mg IV x1 for patient. He wants notified if there is no improvement in pain. Otherwise, patient may discharge to [facility] when criteria met.

## 2024-02-06 NOTE — PROGRESS NOTES
Orthopedic surgery update    Notified recovery patient complaining of right groin pain.  She was positioned supine for the case.  Transferring from cart to bed and back was smooth without any incidents.  No pain on logroll but complains of severe right groin pain at rest.  Will order right hip x-rays to review prior to discharge.              Derik Curry DO   Orthopaedic Surgery   2/6/2024  12:10 PM    Note: This report was completed using computerPaydiant voiced recognition software.  Every effort has been made to ensure accuracy; however, inadvertent computerized transcription errors may be present.

## 2024-02-12 ENCOUNTER — TELEPHONE (OUTPATIENT)
Dept: ORTHOPEDIC SURGERY | Age: 79
End: 2024-02-12

## 2024-02-14 ENCOUNTER — OFFICE VISIT (OUTPATIENT)
Dept: ORTHOPEDIC SURGERY | Age: 79
End: 2024-02-14

## 2024-02-14 DIAGNOSIS — S82.852D CLOSED TRIMALLEOLAR FRACTURE OF LEFT ANKLE WITH ROUTINE HEALING, SUBSEQUENT ENCOUNTER: Primary | ICD-10-CM

## 2024-02-14 PROCEDURE — 99024 POSTOP FOLLOW-UP VISIT: CPT | Performed by: STUDENT IN AN ORGANIZED HEALTH CARE EDUCATION/TRAINING PROGRAM

## 2024-02-14 NOTE — PATIENT INSTRUCTIONS
INSTRUCTIONS FOR FACILITY      Weight Bearing: Non-weight bearing left lower extremity. Use assistive devices when needed.    Therapy: Continue PT/OT  - okay to remove boot multiple times a day and start working on active ankle range of motion    Pain control: per facility physician    Incisional Care: Recommend daily dry dressing changes with nonadherent gauze, 4 x 4's, ABDs, soft cast padding and Ace bandage along with cam boot    DVT Prophylaxis: Continue with aspirin 325 mg twice daily as prescribed    Follow up:     Future Appointments   Date Time Provider Department Center   2/21/2024  9:10 AM Derik Curry DO [facility]       Call office with any questions or concerns.

## 2024-02-14 NOTE — PROGRESS NOTES
Follow Up Post Operative Visit     Surgery: Removal of external fixator, open reduction internal fixation left trimalleolar ankle fracture  Date: 2/6/2024    Subjective:    Shelia Mooney is here for follow up visit s/p above procedure.  She has been doing well in her facility.  She is very anxious about her splint and feeling like it was damp on the inside.  She was brought in today for splint removal and wound check.  Denies fever or chills.  Denies numbness tingling.  Her pain is somewhat well-controlled    Controlled Substances Monitoring:        Physical Exam:    No data recorded    General: Alert and oriented x3, no acute distress  Cardiovascular/pulmonary: No labored breathing, peripheral perfusion intact  Musculoskeletal:    Splint removed: Incisions well-approximated.  There was some dried blood on her bandages.  No active drainage currently.  Postoperative swelling appropriate.  No signs of infection.  Very limited range of motion of the ankle due to immobilization.  Sensation is intact in sural saphenous superficial peroneal deep peroneal and tibial nerve distribution.  Foot is warm well-perfused      Imaging: No new imaging today    Assessment and Plan: 1 week status post removal of external fixator, open reduction internal fixation left trimalleolar ankle fracture    -Reassurance provided.  We have removed her splint and going to place her in a dry dressing.  I recommended daily dry dressing changes at the nursing home and she will transition into a boot.  She can start working on ankle range of motion exercises.  Continue ice and elevation.  Continue aspirin for DVT prophylaxis.  I will see her next week for suture removal.                Derik Curry DO   Orthopaedic Surgery   2/14/24  8:06 AM    Note: This report was completed using [employer] voiced recognition software.  Every effort has been made to ensure accuracy; however, inadvertent computerized transcription errors may be present.

## 2024-02-21 ENCOUNTER — OFFICE VISIT (OUTPATIENT)
Dept: ORTHOPEDIC SURGERY | Age: 79
End: 2024-02-21

## 2024-02-21 ENCOUNTER — TELEPHONE (OUTPATIENT)
Dept: ORTHOPEDIC SURGERY | Age: 79
End: 2024-02-21

## 2024-02-21 DIAGNOSIS — S82.852D CLOSED TRIMALLEOLAR FRACTURE OF LEFT ANKLE WITH ROUTINE HEALING, SUBSEQUENT ENCOUNTER: Primary | ICD-10-CM

## 2024-02-21 PROCEDURE — 99024 POSTOP FOLLOW-UP VISIT: CPT | Performed by: STUDENT IN AN ORGANIZED HEALTH CARE EDUCATION/TRAINING PROGRAM

## 2024-02-21 NOTE — TELEPHONE ENCOUNTER
Andreas from [facility] called.  Patient being discharged from nursing home on 2/23/2024.  Asking if Dr Curry will follow patient for Home PT.      Per Dr Curry will follow patient.  Andreas notified.

## 2024-02-21 NOTE — PATIENT INSTRUCTIONS
INSTRUCTIONS FOR FACILITY      Weight Bearing: Non-weight bearing left lower extremity. Use assistive devices when needed.    Therapy: Continue PT/OT  Please work on ankle range of motion including Achilles and calf stretching and active ankle dorsiflexion    Pain control: per facility physician    Incisional Care: sutures removed today in office. Steri strips placed. Steri strips can be removed in 7-10 days if they do not fall off sooner. Continue to monitor for signs or symptoms of infection until skin has completely healed. Recommend thin layer of Vaseline 1-2x daily over incision line to aid in healing. Okay to shower. No scrubbing near incision until skin has completely healed. Thoroughly pat dry with clean towel.     DVT Prophylaxis: Continue with aspirin 325 mg twice daily      Recommend daily calcium and vitamin D supplementation for bone health    Follow up:     Follow-up in 4 weeks for repeat imaging    No future appointments.    Call office with any questions or concerns.

## 2024-02-21 NOTE — PROGRESS NOTES
Follow Up Post Operative Visit     Surgery: Removal of external fixator, open reduction internal fixation left trimalleolar ankle fracture  Date: 2/6/2024    Subjective:    Shelia Mooney is here for follow up visit s/p above procedure.  She is doing well.  She is here today for suture removal.  She was seen last week for splint issue.  No new changes.    Controlled Substances Monitoring:        Physical Exam:    No data recorded    General: Alert and oriented x3, no acute distress  Cardiovascular/pulmonary: No labored breathing, peripheral perfusion intact  Musculoskeletal:    Splint removed: Incision well-approximated.  Sutures removed and Steri-Strips placed.  Postoperative swelling appropriate.  No signs of infection.  Very limited range of motion of the ankle due to immobilization.  Sensation is intact in sural saphenous superficial peroneal deep peroneal and tibial nerve distribution.  Foot is warm well-perfused      Imaging: X-rays demonstrate appropriate reduction after ORIF of ankle fracture.  No signs of hardware complication.  Appropriate alignment.  Images were independently interpreted by myself and discussed with the patient    Assessment and Plan: 2 week status post removal of external fixator, open reduction internal fixation left trimalleolar ankle fracture    -Reassurance provided.  Continue nonweightbearing.  Range of motion exercises tolerated.  Will see her back in 4 weeks to repeat imaging she can start working on ankle range of motion exercises.  Continue ice and elevation.  Continue aspirin for DVT prophylaxis.  She will likely be nonweightbearing for 8 to 12 weeks after this injury              Derik Curry DO   Orthopaedic Surgery   2/21/24  9:33 AM    Note: This report was completed using computerPelago voiced recognition software.  Every effort has been made to ensure accuracy; however, inadvertent computerized transcription errors may be present.

## 2024-03-20 ENCOUNTER — OFFICE VISIT (OUTPATIENT)
Dept: ORTHOPEDIC SURGERY | Age: 79
End: 2024-03-20

## 2024-03-20 DIAGNOSIS — S82.852D CLOSED TRIMALLEOLAR FRACTURE OF LEFT ANKLE WITH ROUTINE HEALING, SUBSEQUENT ENCOUNTER: Primary | ICD-10-CM

## 2024-03-20 PROCEDURE — 99024 POSTOP FOLLOW-UP VISIT: CPT | Performed by: STUDENT IN AN ORGANIZED HEALTH CARE EDUCATION/TRAINING PROGRAM

## 2024-03-20 NOTE — PROGRESS NOTES
Follow Up Post Operative Visit     Surgery: Removal of external fixator, open reduction internal fixation left trimalleolar ankle fracture  Date: 2/6/2024    Subjective:    Shelia Mooney is here for follow up visit s/p above procedure.  She is transition to assisted living.  She is slow to progress with therapy and painful and hesitant to move her foot.  Overall she is doing well and is happy with the results.    Controlled Substances Monitoring:        Physical Exam:    No data recorded    General: Alert and oriented x3, no acute distress  Cardiovascular/pulmonary: No labored breathing, peripheral perfusion intact  Musculoskeletal:    Postoperative swelling decreased.  Incision well-approximated with mature scar formation..  No signs of infection.  Range of motion is improving to about neutral dorsiflexion and 20 degrees of plantarflexion.  5 out of 5 strength..  Sensation is intact in sural saphenous superficial peroneal deep peroneal and tibial nerve distribution.  Foot is warm well-perfused      Imaging: X-rays demonstrate appropriate reduction after ORIF of ankle fracture.  No signs of hardware complication.  Appropriate alignment.  Images were independently interpreted by myself and discussed with the patient.  Fracture appears to be healing appropriately.    Assessment and Plan: 6 week status post removal of external fixator, open reduction internal fixation left trimalleolar ankle fracture    -Reassurance provided.  Continue nonweightbearing.  Range of motion exercises tolerated.  She really needs to work on aggressive range of motion prevent equinus contracture so she has a good outcome here.  I will see her back in 4 weeks and at that time likely allow her to progress her weightbearing in the boot.  No need for further DVT prophylaxis.  All questions were answered in detail.  Follow-up in 4 weeks for repeat imaging      I explained in detail that he will be 6 months to year for recovery from this.  She is

## 2024-03-20 NOTE — PATIENT INSTRUCTIONS
INSTRUCTIONS FOR FACILITY      Weight Bearing: Non-weight bearing left lower extremity. Use assistive devices when needed.    Therapy: Continue PT/OT  Please follow Ankle protocol at the 6 week fernando  ROM, Strengthening, Flexibility, Mobilization (soft tissue/joint), Balance/Coordination, and Functional Activity    Pain control: per facility physician      DVT Prophylaxis: None required    Follow up:     Follow-up in 4 weeks for repeat imaging    No future appointments.    Call office with any questions or concerns.

## 2024-04-17 ENCOUNTER — OFFICE VISIT (OUTPATIENT)
Dept: ORTHOPEDIC SURGERY | Age: 79
End: 2024-04-17

## 2024-04-17 DIAGNOSIS — S82.852D CLOSED TRIMALLEOLAR FRACTURE OF LEFT ANKLE WITH ROUTINE HEALING, SUBSEQUENT ENCOUNTER: Primary | ICD-10-CM

## 2024-04-17 PROCEDURE — 99024 POSTOP FOLLOW-UP VISIT: CPT | Performed by: STUDENT IN AN ORGANIZED HEALTH CARE EDUCATION/TRAINING PROGRAM

## 2024-04-17 NOTE — PROGRESS NOTES
final x-ray      Derik Curry DO   Orthopaedic Surgery   4/17/24  11:42 AM    Note: This report was completed using computerBlisMedia voiced recognition software.  Every effort has been made to ensure accuracy; however, inadvertent computerized transcription errors may be present.

## 2024-05-15 ENCOUNTER — OFFICE VISIT (OUTPATIENT)
Dept: ORTHOPEDIC SURGERY | Age: 79
End: 2024-05-15
Payer: MEDICARE

## 2024-05-15 DIAGNOSIS — S82.852D CLOSED TRIMALLEOLAR FRACTURE OF LEFT ANKLE WITH ROUTINE HEALING, SUBSEQUENT ENCOUNTER: Primary | ICD-10-CM

## 2024-05-15 PROCEDURE — 1123F ACP DISCUSS/DSCN MKR DOCD: CPT | Performed by: STUDENT IN AN ORGANIZED HEALTH CARE EDUCATION/TRAINING PROGRAM

## 2024-05-15 PROCEDURE — 99213 OFFICE O/P EST LOW 20 MIN: CPT | Performed by: STUDENT IN AN ORGANIZED HEALTH CARE EDUCATION/TRAINING PROGRAM

## 2024-05-15 NOTE — PROGRESS NOTES
Chief Complaint   Patient presents with    Post-Op Check     Medial ankle pain, swelling      DATE OF PROCEDURE: 2/6/2024  PROCEDURE:   Removal of external fixator left lower extremity  Open reduction and fixation left trimalleolar ankle fracture dislocation without fixation of posterior malleolus  Open reduction internal fixation left ankle syndesmosis    POD: 2 months    Subjective:  Shelia Mooney is following up from the above surgery.  She is started ambulating with a walker.  She complains of medial tenderness and sensitivity with wearing certain shoes.  Her lateral side feels fine.  She is ambulating with minimal difficulty with a walker.  She is happy with the results besides medial sided pain.        Review of Systems -  All pertinent positives/negative in HPI     Objective:    General: Alert and oriented X 3, normocephalic atraumatic, external ears and eye normal, sclera clear, no acute distress, respirations easy and unlabored with no audible wheezes, skin warm and dry, speech and dress appropriate for noted age, affect euthymic.    Extremity:  Left Lower Extremity  Skin clean dry and intact, without signs of infection.  Tender over medial malleolus hardware.  Ambulating with a walker with minimal antalgia  Mature scar formation  no edema noted  Compartments supple throughout thigh and leg  Calf supple and not tender  negative Homans  Demonstrates active with flexion and extension  States sensation intact to touch in sural, deep peroneal, superficial peroneal, saphenous, posterior tibial  nerve distributions to foot/ankle.  Palpable dorsalis pedis and posterior tibialis pulses, cap refill brisk in toes, foot warm/perfused.      There were no vitals taken for this visit.    XR: 3 views of the left ankle independently reviewed by myself and discussed with patient shows stable ORIF with evidence of healing. No evidence of hardware loosening or failure. Stable ankle mortise. No acute fractures or dislocations

## 2024-05-21 ENCOUNTER — TELEPHONE (OUTPATIENT)
Dept: ORTHOPEDIC SURGERY | Age: 79
End: 2024-05-21

## 2024-05-21 DIAGNOSIS — S82.852D CLOSED TRIMALLEOLAR FRACTURE OF LEFT ANKLE WITH ROUTINE HEALING, SUBSEQUENT ENCOUNTER: Primary | ICD-10-CM

## 2024-05-21 NOTE — TELEPHONE ENCOUNTER
Patient called, would like to go to [facility] for PT    Order pending for instructions    Order will be faxed

## 2024-07-01 NOTE — ANESTHESIA PRE PROCEDURE
Here for Evenity #5/12  No complaints  Taking calcium and Vit D supplements  Well tolerated  
             BMI:   Wt Readings from Last 3 Encounters:   02/06/24 54.4 kg (120 lb)   01/22/24 54.4 kg (120 lb)   01/22/24 54.4 kg (120 lb)     There is no height or weight on file to calculate BMI.    CBC:   Lab Results   Component Value Date/Time    WBC 18.7 01/24/2024 06:59 AM    RBC 3.69 01/24/2024 06:59 AM    HGB 11.0 01/24/2024 06:59 AM    HCT 33.2 01/24/2024 06:59 AM    MCV 90.0 01/24/2024 06:59 AM    RDW 13.8 01/24/2024 06:59 AM     01/24/2024 06:59 AM       CMP:   Lab Results   Component Value Date/Time     01/24/2024 06:59 AM    K 4.2 01/24/2024 06:59 AM    K 5.2 12/20/2020 10:11 AM     01/24/2024 06:59 AM    CO2 25 01/24/2024 06:59 AM    BUN 12 01/24/2024 06:59 AM    CREATININE 0.6 01/24/2024 06:59 AM    GFRAA >60 12/20/2020 10:11 AM    LABGLOM >60 01/24/2024 06:59 AM    GLUCOSE 121 01/24/2024 06:59 AM    PROT 6.1 12/20/2020 10:11 AM    CALCIUM 9.0 01/24/2024 06:59 AM    BILITOT 0.2 12/20/2020 10:11 AM    ALKPHOS 62 12/20/2020 10:11 AM    AST 42 12/20/2020 10:11 AM    ALT 20 12/20/2020 10:11 AM       POC Tests: No results for input(s): \"POCGLU\", \"POCNA\", \"POCK\", \"POCCL\", \"POCBUN\", \"POCHEMO\", \"POCHCT\" in the last 72 hours.    Coags:   Lab Results   Component Value Date/Time    PROTIME 12.0 01/22/2024 09:56 AM    INR 1.1 01/22/2024 09:56 AM    APTT 31.9 01/23/2024 06:02 AM       HCG (If Applicable): No results found for: \"PREGTESTUR\", \"PREGSERUM\", \"HCG\", \"HCGQUANT\"     ABGs: No results found for: \"PHART\", \"PO2ART\", \"JBW4REC\", \"DXY4VGO\", \"BEART\", \"U8SHCDLR\"     Type & Screen (If Applicable):  No results found for: \"LABABO\", \"LABRH\"    Drug/Infectious Status (If Applicable):  No results found for: \"HIV\", \"HEPCAB\"    COVID-19 Screening (If Applicable): No results found for: \"COVID19\"        Anesthesia Evaluation  Patient summary reviewed and Nursing notes reviewed no history of anesthetic complications:   Airway: Mallampati: II          Dental:      Comment: Missing molars    Pulmonary:

## 2024-08-14 ENCOUNTER — OFFICE VISIT (OUTPATIENT)
Dept: ORTHOPEDIC SURGERY | Age: 79
End: 2024-08-14
Payer: MEDICARE

## 2024-08-14 DIAGNOSIS — S82.852D CLOSED TRIMALLEOLAR FRACTURE OF LEFT ANKLE WITH ROUTINE HEALING, SUBSEQUENT ENCOUNTER: Primary | ICD-10-CM

## 2024-08-14 PROCEDURE — 1123F ACP DISCUSS/DSCN MKR DOCD: CPT | Performed by: STUDENT IN AN ORGANIZED HEALTH CARE EDUCATION/TRAINING PROGRAM

## 2024-08-14 PROCEDURE — 99213 OFFICE O/P EST LOW 20 MIN: CPT | Performed by: STUDENT IN AN ORGANIZED HEALTH CARE EDUCATION/TRAINING PROGRAM

## 2024-08-14 RX ORDER — HYDROCHLOROTHIAZIDE 12.5 MG/1
12.5 TABLET ORAL DAILY
COMMUNITY
Start: 2024-07-19

## 2024-08-14 RX ORDER — GABAPENTIN 100 MG/1
CAPSULE ORAL
COMMUNITY
Start: 2024-08-06

## 2024-08-14 NOTE — PROGRESS NOTES
Chief Complaint   Patient presents with    Follow-up     Left ankle ORIF 2/6/24. Overall doing well but does still have pain and swelling mostly at night. Hardware is irritable      DATE OF PROCEDURE: 2/6/2024  PROCEDURE:   Removal of external fixator left lower extremity  Open reduction and fixation left trimalleolar ankle fracture dislocation without fixation of posterior malleolus  Open reduction internal fixation left ankle syndesmosis    POD: 6 months    Subjective:  Shelia Mooney is following up from the above surgery.  She is doing very well.  She ambulates without assist normally.  Her biggest complaint is medial sided sensitivity and she cannot wear shoes.  It is tender directly over the screw heads.        Review of Systems -  All pertinent positives/negative in HPI     Objective:    General: Alert and oriented X 3, normocephalic atraumatic, external ears and eye normal, sclera clear, no acute distress, respirations easy and unlabored with no audible wheezes, skin warm and dry, speech and dress appropriate for noted age, affect euthymic.    Extremity:  Left Lower Extremity  Skin clean dry and intact, without signs of infection.  Tender over medial malleolus hardware.  Ambulating normally with assist device  Mature scar formation  no edema noted  Compartments supple throughout thigh and leg  Calf supple and not tender  negative Homans  Demonstrates active with flexion and extension  States sensation intact to touch in sural, deep peroneal, superficial peroneal, saphenous, posterior tibial  nerve distributions to foot/ankle.  Palpable dorsalis pedis and posterior tibialis pulses, cap refill brisk in toes, foot warm/perfused.  She is significantly tender over her medial hardware    There were no vitals taken for this visit.    XR: 3 views of the left ankle independently reviewed by myself and discussed with patient shows stable ORIF with evidence of healing. No evidence of hardware loosening or failure.

## 2024-09-06 ENCOUNTER — TELEPHONE (OUTPATIENT)
Dept: ORTHOPEDIC SURGERY | Age: 79
End: 2024-09-06

## 2024-09-06 DIAGNOSIS — S82.852D CLOSED TRIMALLEOLAR FRACTURE OF LEFT ANKLE WITH ROUTINE HEALING, SUBSEQUENT ENCOUNTER: Primary | ICD-10-CM

## 2024-09-20 ENCOUNTER — HOSPITAL ENCOUNTER (OUTPATIENT)
Dept: PHYSICAL THERAPY | Age: 79
Setting detail: THERAPIES SERIES
Discharge: HOME | End: 2024-09-20
Payer: MEDICARE

## 2024-09-20 PROCEDURE — 97161 PT EVAL LOW COMPLEX 20 MIN: CPT | Performed by: PHYSICAL THERAPIST

## 2024-09-20 ASSESSMENT — PAIN SCALES - GENERAL: PAINLEVEL_OUTOF10: 5

## 2024-09-20 ASSESSMENT — PAIN DESCRIPTION - PAIN TYPE: TYPE: CHRONIC PAIN

## 2024-09-20 ASSESSMENT — PAIN DESCRIPTION - LOCATION: LOCATION: ANKLE

## 2024-09-20 ASSESSMENT — PAIN DESCRIPTION - ORIENTATION: ORIENTATION: LEFT

## 2024-09-25 ENCOUNTER — HOSPITAL ENCOUNTER (OUTPATIENT)
Dept: PHYSICAL THERAPY | Age: 79
Setting detail: THERAPIES SERIES
Discharge: HOME | End: 2024-09-25
Payer: MEDICARE

## 2024-09-25 PROCEDURE — 97530 THERAPEUTIC ACTIVITIES: CPT | Performed by: PHYSICAL THERAPIST

## 2024-09-25 PROCEDURE — 97110 THERAPEUTIC EXERCISES: CPT | Performed by: PHYSICAL THERAPIST

## 2024-10-02 ENCOUNTER — HOSPITAL ENCOUNTER (OUTPATIENT)
Dept: PHYSICAL THERAPY | Age: 79
Setting detail: THERAPIES SERIES
Discharge: HOME | End: 2024-10-02
Payer: MEDICARE

## 2024-10-02 PROCEDURE — 97110 THERAPEUTIC EXERCISES: CPT | Performed by: PHYSICAL THERAPIST

## 2024-10-02 PROCEDURE — 97530 THERAPEUTIC ACTIVITIES: CPT | Performed by: PHYSICAL THERAPIST

## 2024-10-02 NOTE — PROGRESS NOTES
today. Gait remains stable with and without SC today.     Plan:   [x] Continue per plan of care [] Alter current plan (see comments)  [] Plan of care initiated [] Hold pending MD visit [] Discharge  Plan for Next Session:         Treatment Charges: Mins Units   Initial Evaluation     Re-Evaluation     Ther Exercise         TE 15 1   Manual Therapy     MT     Ther Activities        TA 30 2   Gait Training          GT     Neuro Re-education NR     Modalities     Non-Billable Service Time     Other     Total Time/Units 45 3     Electronically signed by:  Shiv Snell PT

## 2024-10-11 ENCOUNTER — HOSPITAL ENCOUNTER (OUTPATIENT)
Dept: PHYSICAL THERAPY | Age: 79
Setting detail: THERAPIES SERIES
Discharge: HOME | End: 2024-10-11
Payer: MEDICARE

## 2024-10-11 PROCEDURE — 97530 THERAPEUTIC ACTIVITIES: CPT | Performed by: PHYSICAL THERAPIST

## 2024-10-11 PROCEDURE — 97110 THERAPEUTIC EXERCISES: CPT | Performed by: PHYSICAL THERAPIST

## 2024-10-11 NOTE — PROGRESS NOTES
[facility]  Phone: [phone] Fax: [phone]       Physical Therapy Daily Treatment Note  Date:  10/11/2024    Patient Name:  Shelia Mooney    :  1945  MRN: [id number]    Patient: Shelia Mooney (79 y.o. female)   Examination Date: 2024  Plan of Care Certification Period: 2024 to     :  1945 ;    Confirmed: Yes MRN: [id number]  CSN: [id number]   Insurance: Payor: [facility] MEDICARE / Plan: ANTHEM MEDIBLUE ESSENTIAL/PLUS / Product Type: *No Product type* /   Insurance ID: [id number] - (Medicare Managed) Secondary Insurance (if applicable):    Referring Physician: Preeti De La Fuente APRN *     PCP: Shiv Kay MD Visits to Date/Visits Approved: 3 /      No Show/Cancelled Appts:   /       Medical Diagnosis: Closed trimalleolar fracture of left ankle with routine healing, subsequent encounter [L18.402N]    Treatment Diagnosis:       Time In:   1300    Time Out:  1345       Subjective:  pt reports cont to have pain in ankle. No change     Exercises:  Exercise/Equipment Resistance/Repetitions Other comments   Bike    [id number]            Gastroc stretch    5x20s     Heel raise    2x15           Step ups           side   2x10 6in  2x10 6in           Marching    3x10ea on foam      Sit to stand   2x10 on foam    Side to side ambulation      X6 laps //bars                                                                      Other Therapeutic Activities:      Home Exercise Program:      Manual Treatments:      Modalities:  NT    Timed Code Treatment Minutes:      Total Treatment Minutes:      Treatment/Activity Tolerance:  [x] Patient tolerated treatment well [] Patient limited by fatigue  [] Patient limited by pain  [] Patient limited by other medical complications  [] Other: good tolerance to there ex. Cont to focus on functional activities to increase ROM/strength of ankle region. Gait remains stable with and without SC today. Pain remains across ankle

## 2024-10-18 ENCOUNTER — HOSPITAL ENCOUNTER (OUTPATIENT)
Dept: PHYSICAL THERAPY | Age: 79
Setting detail: THERAPIES SERIES
Discharge: HOME | End: 2024-10-18
Payer: MEDICARE

## 2024-10-18 PROCEDURE — 97530 THERAPEUTIC ACTIVITIES: CPT | Performed by: PHYSICAL THERAPIST

## 2024-10-18 PROCEDURE — 97110 THERAPEUTIC EXERCISES: CPT | Performed by: PHYSICAL THERAPIST

## 2024-10-18 NOTE — PROGRESS NOTES
Gait remains stable with and without SC today. No LOB with exercises. Pain remains across ankle with all activities     Plan:   [x] Continue per plan of care [] Alter current plan (see comments)  [] Plan of care initiated [] Hold pending MD visit [] Discharge  Plan for Next Session:         Treatment Charges: Mins Units   Initial Evaluation     Re-Evaluation     Ther Exercise         TE 15 1   Manual Therapy     MT     Ther Activities        TA 30 2   Gait Training          GT     Neuro Re-education NR     Modalities     Non-Billable Service Time 5    Other     Total Time/Units 50 3     Electronically signed by:  Shiv Snell PT

## 2024-10-24 ENCOUNTER — HOSPITAL ENCOUNTER (OUTPATIENT)
Dept: PHYSICAL THERAPY | Age: 79
Setting detail: THERAPIES SERIES
Discharge: HOME | End: 2024-10-24
Payer: MEDICARE

## 2024-10-24 PROCEDURE — 97530 THERAPEUTIC ACTIVITIES: CPT | Performed by: PHYSICAL THERAPIST

## 2024-10-24 PROCEDURE — 97110 THERAPEUTIC EXERCISES: CPT | Performed by: PHYSICAL THERAPIST

## 2024-10-24 NOTE — PROGRESS NOTES
[facility]  Phone: [phone] Fax: [phone]       Physical Therapy Daily Treatment Note  Date:  10/24/2024    Patient Name:  Shelia Mooney    :  1945  MRN: [id number]    Patient: Shelia Mooney (79 y.o. female)   Examination Date: 2024  Plan of Care Certification Period: 2024 to     :  1945 ;    Confirmed: Yes MRN: [id number]  CSN: [id number]   Insurance: Payor: [facility] MEDICARE / Plan: ANTHEM MEDIBLUE ESSENTIAL/PLUS / Product Type: *No Product type* /   Insurance ID: [id number] - (Medicare Managed) Secondary Insurance (if applicable):    Referring Physician: Preeti De La Fuente APRN *     PCP: Shiv Kay MD Visits to Date/Visits Approved: 5 /      No Show/Cancelled Appts:   /       Medical Diagnosis: Closed trimalleolar fracture of left ankle with routine healing, subsequent encounter [M65.131Z]    Treatment Diagnosis:       Time In:   1400   Time Out:  1450     Subjective:  pt reports pain remains across hardware location in ankle. States increases swelling across ankle/foot lately. Walking with and without cane. Uses it more when ankle pain is severe.     Exercises:  Exercise/Equipment Resistance/Repetitions Other comments   Bike    [id number]            Gastroc stretch    5x20s     Heel raise    2x15           Step ups           side   2x10 6in            Marching    3x10ea on foam      Sit to stand   2x10 on foam    Side to side ambulation      X6 laps //bars    SLS     5x10s ea foam                                                               Other Therapeutic Activities:      Home Exercise Program:      Manual Treatments:      Modalities:  NT    Timed Code Treatment Minutes:      Total Treatment Minutes:      Treatment/Activity Tolerance:  [x] Patient tolerated treatment well [] Patient limited by fatigue  [] Patient limited by pain  [] Patient limited by other medical complications  [] Other: good tolerance to there ex. Cont to focus

## 2025-01-08 ENCOUNTER — OFFICE VISIT (OUTPATIENT)
Dept: ORTHOPEDIC SURGERY | Age: 80
End: 2025-01-08
Payer: MEDICARE

## 2025-01-08 VITALS
SYSTOLIC BLOOD PRESSURE: 140 MMHG | HEART RATE: 112 BPM | RESPIRATION RATE: 16 BRPM | TEMPERATURE: 97.8 F | OXYGEN SATURATION: 99 % | DIASTOLIC BLOOD PRESSURE: 77 MMHG

## 2025-01-08 DIAGNOSIS — S82.852D CLOSED TRIMALLEOLAR FRACTURE OF LEFT ANKLE WITH ROUTINE HEALING, SUBSEQUENT ENCOUNTER: Primary | ICD-10-CM

## 2025-01-08 PROCEDURE — 1160F RVW MEDS BY RX/DR IN RCRD: CPT | Performed by: STUDENT IN AN ORGANIZED HEALTH CARE EDUCATION/TRAINING PROGRAM

## 2025-01-08 PROCEDURE — 1125F AMNT PAIN NOTED PAIN PRSNT: CPT | Performed by: STUDENT IN AN ORGANIZED HEALTH CARE EDUCATION/TRAINING PROGRAM

## 2025-01-08 PROCEDURE — 99213 OFFICE O/P EST LOW 20 MIN: CPT | Performed by: STUDENT IN AN ORGANIZED HEALTH CARE EDUCATION/TRAINING PROGRAM

## 2025-01-08 PROCEDURE — 1123F ACP DISCUSS/DSCN MKR DOCD: CPT | Performed by: STUDENT IN AN ORGANIZED HEALTH CARE EDUCATION/TRAINING PROGRAM

## 2025-01-08 PROCEDURE — 1159F MED LIST DOCD IN RCRD: CPT | Performed by: STUDENT IN AN ORGANIZED HEALTH CARE EDUCATION/TRAINING PROGRAM

## 2025-01-08 NOTE — PROGRESS NOTES
Chief Complaint   Patient presents with    Follow-up     F/u on left ankle      DATE OF PROCEDURE: 2/6/2024  PROCEDURE:   Removal of external fixator left lower extremity  Open reduction and fixation left trimalleolar ankle fracture dislocation without fixation of posterior malleolus  Open reduction internal fixation left ankle syndesmosis    HPI update 1/8/2025:    Subjective:  Shelia Mooney is following up from the above surgery.  She is doing very well.  She ambulates without assist normally.  Her biggest complaint is medial sided sensitivity and she cannot wear shoes.  It is tender directly over the screw heads.        Review of Systems -  All pertinent positives/negative in HPI     Objective:    General: Alert and oriented X 3, normocephalic atraumatic, external ears and eye normal, sclera clear, no acute distress, respirations easy and unlabored with no audible wheezes, skin warm and dry, speech and dress appropriate for noted age, affect euthymic.    Extremity:  Left Lower Extremity  Skin clean dry and intact, without signs of infection.  Tender over medial malleolus hardware.  Ambulating normally with assist device  Mature scar formation  no edema noted  Compartments supple throughout thigh and leg  Calf supple and not tender  negative Homans  Demonstrates active with flexion and extension  States sensation intact to touch in sural, deep peroneal, superficial peroneal, saphenous, posterior tibial  nerve distributions to foot/ankle.  Palpable dorsalis pedis and posterior tibialis pulses, cap refill brisk in toes, foot warm/perfused.  She is significantly tender over her medial hardware    BP (!) 140/77   Pulse (!) 112   Temp 97.8 °F (36.6 °C)   Resp 16   SpO2 99%     XR: 3 views of the left ankle independently reviewed by myself and discussed with patient shows stable ORIF with evidence of healing. No evidence of hardware loosening or failure. Stable ankle mortise. No acute fractures or dislocations

## 2025-01-29 ENCOUNTER — TELEPHONE (OUTPATIENT)
Dept: ORTHOPEDIC SURGERY | Age: 80
End: 2025-01-29

## 2025-01-29 NOTE — TELEPHONE ENCOUNTER
Patient called to request surgery date for Removal of hardware left ankle. She has appt here for pre-op discussion 2/19/2025.  Can we add her on for 3/10/2025    Patient asking if anesthesia would be LMAC?

## 2025-02-03 ENCOUNTER — TELEPHONE (OUTPATIENT)
Dept: ORTHOPEDIC SURGERY | Age: 80
End: 2025-02-03

## 2025-02-03 ENCOUNTER — PREP FOR PROCEDURE (OUTPATIENT)
Dept: ORTHOPEDIC SURGERY | Age: 80
End: 2025-02-03

## 2025-02-03 DIAGNOSIS — T84.84XA PAINFUL ORTHOPAEDIC HARDWARE: ICD-10-CM

## 2025-02-03 NOTE — TELEPHONE ENCOUNTER
Surgical Procedure: Removal of Hardware Left ankle  ICD Code: T84.84XA  CPT Code: 45345  Vendor: [employer]  Needs: C-ARM  Anesthesia: LMAC with Local  Date: 3/10/2025  Place: [facility]  Surgeon: ANTHONY Burger       Pre Op Instructions reviewed with the patient.   Informed patient: A hospital nurse will contact her with instructions for the day of surgery. The patient expresses understanding and is in agreement with the plan.      Post Op Appointment: Date: 3/26/2025 Time: 10:30 a.m.

## 2025-02-04 ENCOUNTER — TELEPHONE (OUTPATIENT)
Dept: ORTHOPEDIC SURGERY | Age: 80
End: 2025-02-04

## 2025-02-04 NOTE — TELEPHONE ENCOUNTER
Prior Authorization [employer]    Procedure:  Removal Hardware Left Ankle  Procedure Code: 09276  Diagnosis Code:  T84.84XA  Date:  3/10/2025  Facility:  [facility]  Status: OPT  Physician:  Derik Curry D.O.  Transaction #  [id number]  Auth Number: No Authorization Required  Contact: [facility] Provider Portal

## 2025-02-12 ENCOUNTER — TELEPHONE (OUTPATIENT)
Dept: ORTHOPEDIC SURGERY | Age: 80
End: 2025-02-12

## 2025-02-19 ENCOUNTER — OFFICE VISIT (OUTPATIENT)
Dept: ORTHOPEDIC SURGERY | Age: 80
End: 2025-02-19

## 2025-02-19 VITALS
HEART RATE: 107 BPM | RESPIRATION RATE: 12 BRPM | OXYGEN SATURATION: 96 % | TEMPERATURE: 97.9 F | SYSTOLIC BLOOD PRESSURE: 137 MMHG | DIASTOLIC BLOOD PRESSURE: 71 MMHG

## 2025-02-19 DIAGNOSIS — T84.84XA PAINFUL ORTHOPAEDIC HARDWARE: Primary | ICD-10-CM

## 2025-02-19 NOTE — PROGRESS NOTES
Chief Complaint   Patient presents with    Follow-up     Discuss surgery left ankle hardware removal on 03/10/25      DATE OF PROCEDURE: 2/6/2024  PROCEDURE:   Removal of external fixator left lower extremity  Open reduction and fixation left trimalleolar ankle fracture dislocation without fixation of posterior malleolus  Open reduction internal fixation left ankle syndesmosis    HPI update 1/8/2025:    Subjective:  Shelia Mooney is following up from the above surgery.  She is tolerating pain and symptomatic hardware in her ankle.  She is decided to move forward with hardware removal.  She is here today to discuss surgical intervention      Review of Systems -  All pertinent positives/negative in HPI     Objective:    General: Alert and oriented X 3, normocephalic atraumatic, external ears and eye normal, sclera clear, no acute distress, respirations easy and unlabored with no audible wheezes, skin warm and dry, speech and dress appropriate for noted age, affect euthymic.    Extremity:  Left Lower Extremity  Skin clean dry and intact, without signs of infection.  Tender over medial malleolus hardware.  Ambulating normally with assist device  Mature scar formation  no edema noted  Compartments supple throughout thigh and leg  Calf supple and not tender  negative Homans  Demonstrates active with flexion and extension  States sensation intact to touch in sural, deep peroneal, superficial peroneal, saphenous, posterior tibial  nerve distributions to foot/ankle.  Palpable dorsalis pedis and posterior tibialis pulses, cap refill brisk in toes, foot warm/perfused.  She is significantly tender over her medial hardware    /71   Pulse (!) 107   Temp 97.9 °F (36.6 °C)   Resp 12   SpO2 96%     XR: 3 views of the left ankle independently reviewed by myself and discussed with patient shows stable ORIF with evidence of healing. No evidence of hardware loosening or failure. Stable ankle mortise. No acute fractures or

## 2025-03-10 ENCOUNTER — TELEPHONE (OUTPATIENT)
Dept: ORTHOPEDIC SURGERY | Age: 80
End: 2025-03-10

## 2025-03-10 ENCOUNTER — HOSPITAL ENCOUNTER (OUTPATIENT)
Dept: GENERAL RADIOLOGY | Age: 80
Setting detail: OUTPATIENT SURGERY
Discharge: HOME | End: 2025-03-12
Attending: STUDENT IN AN ORGANIZED HEALTH CARE EDUCATION/TRAINING PROGRAM

## 2025-03-10 ENCOUNTER — PREP FOR PROCEDURE (OUTPATIENT)
Dept: ORTHOPEDIC SURGERY | Age: 80
End: 2025-03-10

## 2025-03-10 DIAGNOSIS — R52 PAIN: ICD-10-CM

## 2025-03-10 NOTE — TELEPHONE ENCOUNTER
Patient's surgery rescheduled for 4/21/2025 [facility]. Patient informed of date and time.  [facility] will call her with instructions for day of surgery.

## 2025-03-19 ENCOUNTER — ANESTHESIA EVENT (OUTPATIENT)
Dept: OPERATING ROOM | Age: 80
End: 2025-03-19
Payer: MEDICARE

## 2025-03-19 ASSESSMENT — LIFESTYLE VARIABLES: SMOKING_STATUS: 0

## 2025-03-19 NOTE — PROGRESS NOTES
[facility] PRE-ADMISSION TESTING INSTRUCTIONS    The Preadmission Testing patient is instructed accordingly using the following criteria (check applicable):    ARRIVAL INSTRUCTIONS:  [x] Parking the day of Surgery is located in the Main Entrance lot.  Upon entering the door, make an immediate right to the surgery reception desk    [x] Bring photo ID and insurance card    [x] Bring in a copy of Living will or Durable Power of  papers.    [x] Please be sure to arrange for responsible adult to provide transportation to and from the hospital    [x] Please arrange for responsible adult to be with you for the 24 hour period post procedure due to having anesthesia    [x] If you awake am of surgery not feeling well or have temperature >100 please call [phone]    GENERAL INSTRUCTIONS:    [x] May have clear liquids until 4 hours prior to surgery. Examples include water, fruit juices (no pulp), jello, popsicles, black coffee or tea,                No gum, candy or mints.    [x] You may brush your teeth, but do not swallow any water    [x] Take medications as instructed with 1-2 oz of water    [x] Stop herbal supplements and vitamins 5 days prior to procedure    [] Follow preop dosing of blood thinners per physician instructions    [] Take 1/2 dose of evening insulin, but no insulin after midnight    [] No oral diabetic medications after midnight    [] If diabetic and have low blood sugar or feel symptomatic, take 1-2oz apple juice only    [] Bring inhalers day of surgery    [] Bring C-PAP/ Bi-Pap day of surgery    [] Bring urine specimen day of surgery    [x] Shower or bath with soap, lather and rinse well, AM of Surgery, no lotion, powders or creams to surgical site    [] Follow bowel prep as instructed per surgeon    [x] No tobacco products within 24 hours of surgery     [x] No alcohol or illegal drug use within 24 hours of surgery.    [x] Jewelry, body piercing's, eyeglasses, contact

## 2025-03-19 NOTE — ANESTHESIA PRE PROCEDURE
12/20/2020 10:11 AM    ALKPHOS 62 12/20/2020 10:11 AM    AST 42 12/20/2020 10:11 AM    ALT 20 12/20/2020 10:11 AM       POC Tests: No results for input(s): \"POCGLU\", \"POCNA\", \"POCK\", \"POCCL\", \"POCBUN\", \"POCHEMO\", \"POCHCT\" in the last 72 hours.    Coags:   Lab Results   Component Value Date/Time    PROTIME 12.0 01/22/2024 09:56 AM    INR 1.1 01/22/2024 09:56 AM    APTT 31.9 01/23/2024 06:02 AM       HCG (If Applicable): No results found for: \"PREGTESTUR\", \"PREGSERUM\", \"HCG\", \"HCGQUANT\"     ABGs: No results found for: \"PHART\", \"PO2ART\", \"VBM1EMZ\", \"JVK0KYU\", \"BEART\", \"V2PBMPNN\"     Type & Screen (If Applicable):  Lab Results   Component Value Date    ABORH O POSITIVE 01/22/2024    LABANTI NEGATIVE 01/22/2024       Drug/Infectious Status (If Applicable):  No results found for: \"HIV\", \"HEPCAB\"    COVID-19 Screening (If Applicable): No results found for: \"COVID19\"        Anesthesia Evaluation     history of anesthetic complications (Prolonged emergence from general anesthesia):   Airway: Mallampati: II  TM distance: >3 FB   Neck ROM: full  Mouth opening: > = 3 FB   Dental:          Pulmonary:       (-) not a current smoker (former smoker)                           Cardiovascular:    (+) hypertension:, hyperlipidemia               ROS comment: Stress test 2023  IMPRESSION:  1.  ECG during the infusion did not change.   2.  The myocardial perfusion imaging was normal without ischemia or  infarct.  3.  Overall left ventricular systolic function was normal without  regional wall motion abnormalities.  4.  No transient ischemic dilatation.  5.  Low risk general pharmacologic stress test.       Neuro/Psych:   (+) depression/anxiety             GI/Hepatic/Renal:             Endo/Other:    (+) hypothyroidism::..                 Abdominal:             Vascular:          Other Findings:       Anesthesia Plan      MAC     ASA 2       Induction: intravenous.    MIPS: Postoperative opioids intended and Prophylactic antiemetics

## 2025-03-20 NOTE — DISCHARGE INSTRUCTIONS
[facility] Department of Orthopedic Surgery  [address]   [id number]   [place] [address]    Dr. Derik Curry, DO    Orthopaedics Discharge Instructions   Weight bearing Status - Weight bearing as tolerated - on left lower Extremity in the CAM boot  Pain medication Per Prescriptions  Contact Office for Medication Refill- [phone]  Office can refill pain med every 7 days  If patient discharging to facility then pain control will be continued per facility physician  Ice to operative/injured site for 15-30 minutes of each hour for next 5 days    Recommend that you continue to ice the area 2-3 times per day after this   Elevate operative/injured limb on 2 pillows at home  Goal is to have limb above the heart if able  Continue DVT Prophylaxis (blood clot prevention) as Prescribed: Aspirin 81 mg twice daily for 28 days   Wound care - Dressing can be removed in 7 days. Keep incision clean and dry. Sutures will be removed in office at follow up.     Follow Up in Office in 2 weeks. Your first post op appointment is often the PAs.     Call the office at [phone] directions or with any questions.  Watch for these significant complications.  Call physician if they or any other problems occur:  Fever over 101°, redness, swelling or warmth at the operative site  Unrelieved nausea    Foul smelling or cloudy drainage at the operative site   Unrelieved pain    Blood soaked dressing. (Some oozing may be normal)     Numb, pale, blue, cold or tingling extremity    Future Appointments   Date Time Provider Department Center   4/2/2025  8:50 AM Derik Curry DO [facility]         It is the Department of Orthopaedic Trauma's standard of practice that providers will de-escalate(wean) all patients from narcotic(opioid) medications during the post-operative period.   We provide multimodal pain control but opioid medications are tapered in all of our patients.  If patient requires referral to pain management

## 2025-03-21 ENCOUNTER — ANESTHESIA (OUTPATIENT)
Dept: OPERATING ROOM | Age: 80
End: 2025-03-21
Payer: MEDICARE

## 2025-03-21 ENCOUNTER — HOSPITAL ENCOUNTER (OUTPATIENT)
Age: 80
Setting detail: OUTPATIENT SURGERY
Discharge: HOME | End: 2025-03-21
Attending: STUDENT IN AN ORGANIZED HEALTH CARE EDUCATION/TRAINING PROGRAM | Admitting: STUDENT IN AN ORGANIZED HEALTH CARE EDUCATION/TRAINING PROGRAM
Payer: MEDICARE

## 2025-03-21 ENCOUNTER — APPOINTMENT (OUTPATIENT)
Dept: GENERAL RADIOLOGY | Age: 80
End: 2025-03-21
Attending: STUDENT IN AN ORGANIZED HEALTH CARE EDUCATION/TRAINING PROGRAM
Payer: MEDICARE

## 2025-03-21 VITALS
HEIGHT: 59 IN | BODY MASS INDEX: 23.39 KG/M2 | HEART RATE: 77 BPM | DIASTOLIC BLOOD PRESSURE: 64 MMHG | RESPIRATION RATE: 16 BRPM | OXYGEN SATURATION: 97 % | TEMPERATURE: 97 F | SYSTOLIC BLOOD PRESSURE: 132 MMHG | WEIGHT: 116 LBS

## 2025-03-21 DIAGNOSIS — T84.84XA PAINFUL ORTHOPAEDIC HARDWARE: ICD-10-CM

## 2025-03-21 DIAGNOSIS — G89.18 POST-OPERATIVE PAIN: Primary | ICD-10-CM

## 2025-03-21 PROCEDURE — 2500000003 HC RX 250 WO HCPCS: Performed by: STUDENT IN AN ORGANIZED HEALTH CARE EDUCATION/TRAINING PROGRAM

## 2025-03-21 PROCEDURE — 6360000002 HC RX W HCPCS

## 2025-03-21 PROCEDURE — 3700000000 HC ANESTHESIA ATTENDED CARE: Performed by: STUDENT IN AN ORGANIZED HEALTH CARE EDUCATION/TRAINING PROGRAM

## 2025-03-21 PROCEDURE — 3600000012 HC SURGERY LEVEL 2 ADDTL 15MIN: Performed by: STUDENT IN AN ORGANIZED HEALTH CARE EDUCATION/TRAINING PROGRAM

## 2025-03-21 PROCEDURE — 2580000003 HC RX 258: Performed by: STUDENT IN AN ORGANIZED HEALTH CARE EDUCATION/TRAINING PROGRAM

## 2025-03-21 PROCEDURE — 7100000011 HC PHASE II RECOVERY - ADDTL 15 MIN: Performed by: STUDENT IN AN ORGANIZED HEALTH CARE EDUCATION/TRAINING PROGRAM

## 2025-03-21 PROCEDURE — 3600000002 HC SURGERY LEVEL 2 BASE: Performed by: STUDENT IN AN ORGANIZED HEALTH CARE EDUCATION/TRAINING PROGRAM

## 2025-03-21 PROCEDURE — 2709999900 HC NON-CHARGEABLE SUPPLY: Performed by: STUDENT IN AN ORGANIZED HEALTH CARE EDUCATION/TRAINING PROGRAM

## 2025-03-21 PROCEDURE — 3700000001 HC ADD 15 MINUTES (ANESTHESIA): Performed by: STUDENT IN AN ORGANIZED HEALTH CARE EDUCATION/TRAINING PROGRAM

## 2025-03-21 PROCEDURE — 7100000010 HC PHASE II RECOVERY - FIRST 15 MIN: Performed by: STUDENT IN AN ORGANIZED HEALTH CARE EDUCATION/TRAINING PROGRAM

## 2025-03-21 PROCEDURE — 6360000002 HC RX W HCPCS: Performed by: STUDENT IN AN ORGANIZED HEALTH CARE EDUCATION/TRAINING PROGRAM

## 2025-03-21 RX ORDER — ASPIRIN 81 MG/1
81 TABLET ORAL 2 TIMES DAILY
Qty: 56 TABLET | Refills: 0 | Status: SHIPPED | OUTPATIENT
Start: 2025-03-21

## 2025-03-21 RX ORDER — SODIUM CHLORIDE 0.9 % (FLUSH) 0.9 %
5-40 SYRINGE (ML) INJECTION EVERY 12 HOURS SCHEDULED
Status: DISCONTINUED | OUTPATIENT
Start: 2025-03-21 | End: 2025-03-21 | Stop reason: HOSPADM

## 2025-03-21 RX ORDER — HYDRALAZINE HYDROCHLORIDE 20 MG/ML
10 INJECTION INTRAMUSCULAR; INTRAVENOUS
Status: DISCONTINUED | OUTPATIENT
Start: 2025-03-21 | End: 2025-03-21 | Stop reason: HOSPADM

## 2025-03-21 RX ORDER — ONDANSETRON 2 MG/ML
INJECTION INTRAMUSCULAR; INTRAVENOUS
Status: DISCONTINUED | OUTPATIENT
Start: 2025-03-21 | End: 2025-03-21 | Stop reason: SDUPTHER

## 2025-03-21 RX ORDER — OXYCODONE HYDROCHLORIDE 5 MG/1
5 TABLET ORAL EVERY 6 HOURS PRN
Qty: 28 TABLET | Refills: 0 | Status: SHIPPED | OUTPATIENT
Start: 2025-03-21 | End: 2025-03-28

## 2025-03-21 RX ORDER — SODIUM CHLORIDE 9 MG/ML
INJECTION, SOLUTION INTRAVENOUS PRN
Status: DISCONTINUED | OUTPATIENT
Start: 2025-03-21 | End: 2025-03-21 | Stop reason: HOSPADM

## 2025-03-21 RX ORDER — ONDANSETRON 2 MG/ML
4 INJECTION INTRAMUSCULAR; INTRAVENOUS
Status: DISCONTINUED | OUTPATIENT
Start: 2025-03-21 | End: 2025-03-21 | Stop reason: HOSPADM

## 2025-03-21 RX ORDER — SODIUM CHLORIDE 0.9 % (FLUSH) 0.9 %
5-40 SYRINGE (ML) INJECTION PRN
Status: DISCONTINUED | OUTPATIENT
Start: 2025-03-21 | End: 2025-03-21 | Stop reason: HOSPADM

## 2025-03-21 RX ORDER — PROPOFOL 10 MG/ML
INJECTION, EMULSION INTRAVENOUS
Status: DISCONTINUED | OUTPATIENT
Start: 2025-03-21 | End: 2025-03-21 | Stop reason: SDUPTHER

## 2025-03-21 RX ORDER — LABETALOL HYDROCHLORIDE 5 MG/ML
10 INJECTION, SOLUTION INTRAVENOUS
Status: DISCONTINUED | OUTPATIENT
Start: 2025-03-21 | End: 2025-03-21 | Stop reason: HOSPADM

## 2025-03-21 RX ORDER — MEPERIDINE HYDROCHLORIDE 50 MG/ML
12.5 INJECTION INTRAMUSCULAR; INTRAVENOUS; SUBCUTANEOUS EVERY 5 MIN PRN
Status: DISCONTINUED | OUTPATIENT
Start: 2025-03-21 | End: 2025-03-21 | Stop reason: HOSPADM

## 2025-03-21 RX ORDER — IPRATROPIUM BROMIDE AND ALBUTEROL SULFATE 2.5; .5 MG/3ML; MG/3ML
1 SOLUTION RESPIRATORY (INHALATION)
Status: DISCONTINUED | OUTPATIENT
Start: 2025-03-21 | End: 2025-03-21 | Stop reason: HOSPADM

## 2025-03-21 RX ORDER — LIDOCAINE HYDROCHLORIDE 10 MG/ML
INJECTION, SOLUTION INFILTRATION; PERINEURAL PRN
Status: DISCONTINUED | OUTPATIENT
Start: 2025-03-21 | End: 2025-03-21 | Stop reason: ALTCHOICE

## 2025-03-21 RX ORDER — SODIUM CHLORIDE 9 MG/ML
INJECTION, SOLUTION INTRAVENOUS CONTINUOUS
Status: DISCONTINUED | OUTPATIENT
Start: 2025-03-21 | End: 2025-03-21 | Stop reason: HOSPADM

## 2025-03-21 RX ORDER — NALOXONE HYDROCHLORIDE 0.4 MG/ML
INJECTION, SOLUTION INTRAMUSCULAR; INTRAVENOUS; SUBCUTANEOUS PRN
Status: DISCONTINUED | OUTPATIENT
Start: 2025-03-21 | End: 2025-03-21 | Stop reason: HOSPADM

## 2025-03-21 RX ORDER — FENTANYL CITRATE 50 UG/ML
INJECTION, SOLUTION INTRAMUSCULAR; INTRAVENOUS
Status: DISCONTINUED | OUTPATIENT
Start: 2025-03-21 | End: 2025-03-21 | Stop reason: SDUPTHER

## 2025-03-21 RX ORDER — LIDOCAINE HYDROCHLORIDE 20 MG/ML
INJECTION, SOLUTION EPIDURAL; INFILTRATION; INTRACAUDAL; PERINEURAL
Status: DISCONTINUED | OUTPATIENT
Start: 2025-03-21 | End: 2025-03-21 | Stop reason: SDUPTHER

## 2025-03-21 RX ORDER — MIDAZOLAM HYDROCHLORIDE 2 MG/2ML
2 INJECTION, SOLUTION INTRAMUSCULAR; INTRAVENOUS
Status: DISCONTINUED | OUTPATIENT
Start: 2025-03-21 | End: 2025-03-21 | Stop reason: HOSPADM

## 2025-03-21 RX ADMIN — WATER 2000 MG: 1 INJECTION INTRAMUSCULAR; INTRAVENOUS; SUBCUTANEOUS at 11:17

## 2025-03-21 RX ADMIN — FENTANYL CITRATE 50 MCG: 50 INJECTION, SOLUTION INTRAMUSCULAR; INTRAVENOUS at 11:22

## 2025-03-21 RX ADMIN — FENTANYL CITRATE 50 MCG: 50 INJECTION, SOLUTION INTRAMUSCULAR; INTRAVENOUS at 11:17

## 2025-03-21 RX ADMIN — SODIUM CHLORIDE: 9 INJECTION, SOLUTION INTRAVENOUS at 09:49

## 2025-03-21 RX ADMIN — PROPOFOL 50 MCG/KG/MIN: 10 INJECTION, EMULSION INTRAVENOUS at 11:14

## 2025-03-21 RX ADMIN — LIDOCAINE HYDROCHLORIDE 40 MG: 20 INJECTION, SOLUTION EPIDURAL; INFILTRATION; INTRACAUDAL; PERINEURAL at 11:13

## 2025-03-21 RX ADMIN — PROPOFOL 40 MG: 10 INJECTION, EMULSION INTRAVENOUS at 11:13

## 2025-03-21 RX ADMIN — ONDANSETRON 4 MG: 2 INJECTION INTRAMUSCULAR; INTRAVENOUS at 11:17

## 2025-03-21 ASSESSMENT — PAIN - FUNCTIONAL ASSESSMENT
PAIN_FUNCTIONAL_ASSESSMENT: PREVENTS OR INTERFERES SOME ACTIVE ACTIVITIES AND ADLS
PAIN_FUNCTIONAL_ASSESSMENT: 0-10

## 2025-03-21 ASSESSMENT — PAIN DESCRIPTION - DESCRIPTORS: DESCRIPTORS: DISCOMFORT

## 2025-03-21 NOTE — BRIEF OP NOTE
Brief Postoperative Note      Patient: Shelia Mooney  YOB: 1945  MRN: [id number]    Date of Procedure: 3/21/2025    Pre-Op Diagnosis Codes:      * Painful orthopaedic hardware left ankle    Post-Op Diagnosis: Same       Procedure(s):  LEFT ANKLE HARDWARE REMOVAL    Surgeon(s):  Derik Curry DO    Assistant:  * No surgical staff found *    Anesthesia: Monitor Anesthesia Care    Estimated Blood Loss (mL): Minimal    Complications: None    Specimens:   ID Type Source Tests Collected by Time Destination   A : EXPLANTED HARDWARE Hardware Hardware SURGICAL PATHOLOGY Derik Curry DO 3/21/2025 1135        Implants:  * No implants in log *      Drains: * No LDAs found *    Findings:  Infection Present At Time Of Surgery (PATOS) (choose all levels that have infection present):  No infection present  Other Findings: Removal of medial malleolus screws, removal of syndesmotic screws left ankle    Electronically signed by Derik Curry DO on 3/21/2025 at 11:36 AM

## 2025-03-21 NOTE — OP NOTE
Operative Note      Patient: Shelia Mooney  YOB: 1945  MRN: [id number]    Date of Procedure: 3/21/2025    Pre-Op Diagnosis Codes:      * Painful orthopaedic hardware left ankle    Post-Op Diagnosis: Same       Procedure(s):  LEFT ANKLE HARDWARE REMOVAL    Surgeon(s):  Derik Curry DO    Assistant:   * No surgical staff found *    Anesthesia: Monitor Anesthesia Care    Estimated Blood Loss (mL): Minimal    Complications: None    Specimens:   ID Type Source Tests Collected by Time Destination   A : EXPLANTED HARDWARE Hardware Hardware SURGICAL PATHOLOGY Derik Curry DO 3/21/2025 1135        Implants:  * No implants in log *      Drains: * No LDAs found *    Findings:  Infection Present At Time Of Surgery (PATOS) (choose all levels that have infection present):  No infection present  Other Findings: See operative report below    Detailed Description of Procedure:   This is an 79-year-old female who presents with painful orthopedic hardware in her left ankle after open reduction internal fixation left trimalleolar ankle fracture.  She is tender over the screw heads and complains of restricted dorsiflexion.  She is here today for removal of her painful medial malleolus screws and her syndesmotic screws. Risks, benefits, and alternatives were discussed with the patient and their family. Risks include but are not limited to infection, blood loss, damage to neurovascular and musculoskeletal structures, malunion, nonunion, symptomatic hardware, need for further surgery, possible arthritis despite surgical stabilization, stiffness, and catastrophic anesthesia complications. They understand and wish to proceed.   Informed consent was obtained and signed and placed in the chart.  My initials were then placed on her left hip.  Patient was brought to the operating room and carefully transferred supine to the OR table.  All bony prominences were well-padded.  Monitored anesthesia care was induced.  Left leg

## 2025-03-21 NOTE — H&P
Interval H&P update:    I agree with the nurse practitioner's H&P and earlier.  Patient here today for left ankle hardware removal    Risk benefits and alternatives of surgery discussed.  My initials were placed on the left ankle.  Informed consent was obtained and signed and placed in chart.                Derik Curry DO   Orthopaedic Surgery   3/21/2025  11:00 AM    Note: This report was completed using computerize voiced recognition software.  Every effort has been made to ensure accuracy; however, inadvertent computerized transcription errors may be present.    
y.o. year old female with left ankle symptomatic hardware  - They are here today for left ankle hardware removal  - Risks, benefits, and alternatives were discussed with the patient and their family. Risks include but are not limited to infection, blood loss, damage to neurovascular and musculoskeletal structures, malunion, nonunion, symptomatic hardware, need for further surgery, possible arthritis despite surgical stabilization, stiffness, and catastrophic anesthesia complications. They understand and wish to proceed.    - consent   - site marked  - Discharge home after surgery. Follow up in office in 2 weeks     GREY Hughes-CNP  Orthopedic Surgery   03/21/25  10:03 AM

## 2025-03-21 NOTE — PROGRESS NOTES
Patient and daughter expressing concern that they haven't to spoken with Dr. Curry.  Dr. Curry updated and came to room to talk with patient and daughter.

## 2025-03-27 LAB — SURGICAL PATHOLOGY REPORT: NORMAL

## 2025-04-02 ENCOUNTER — OFFICE VISIT (OUTPATIENT)
Dept: ORTHOPEDIC SURGERY | Age: 80
End: 2025-04-02

## 2025-04-02 VITALS
SYSTOLIC BLOOD PRESSURE: 143 MMHG | OXYGEN SATURATION: 96 % | HEART RATE: 89 BPM | TEMPERATURE: 96.6 F | DIASTOLIC BLOOD PRESSURE: 76 MMHG

## 2025-04-02 DIAGNOSIS — T84.84XA PAINFUL ORTHOPAEDIC HARDWARE: Primary | ICD-10-CM

## 2025-04-02 PROCEDURE — 99024 POSTOP FOLLOW-UP VISIT: CPT | Performed by: STUDENT IN AN ORGANIZED HEALTH CARE EDUCATION/TRAINING PROGRAM

## 2025-04-02 NOTE — PROGRESS NOTES
Follow Up Post Operative Visit     Surgery: Removal of painful orthopedic hardware from left ankle  Date: 3/21/2025    Subjective:    Shelia Mooney is here for follow up visit s/p above procedure.  She is doing very well.  Pain is well-controlled.  She has had no drainage.  She has been ambulating in the cam boot and taking aspirin for DVT prophylaxis    Controlled Substances Monitoring:        Physical Exam:    BP: (!) 143/76    General: Alert and oriented x3, no acute distress  Cardiovascular/pulmonary: No labored breathing, peripheral perfusion intact  Musculoskeletal:    Left ankle: Sutures removed Steri-Strips placed.  No signs of infection or drainage.  Plantar dorsiflexion symmetric with contralateral side.  Nontender around her incisions.  Foot is warm well-perfused.      Imaging: 3 views of the left ankle demonstrate removal of 1 syndesmotic screw, distal end of second syndesmotic screw within the tibia but not across the syndesmosis.  Complete removal of medial mall screws.  No signs of complication.  No signs of refracture    Assessment and Plan: 2 weeks status post removal hardware from left ankle    -She can discontinue the cam boot.  She can wean into regular shoe as tolerated weightbearing as tolerated.  Finish DVT prophylaxis.  Incisional care discussed.  Follow-up in 4 weeks for repeat imaging.              Derik Curry DO   Orthopaedic Surgery   4/2/25  10:42 AM    Note: This report was completed using [employer] voiced recognition software.  Every effort has been made to ensure accuracy; however, inadvertent computerized transcription errors may be present.

## 2025-04-30 ENCOUNTER — OFFICE VISIT (OUTPATIENT)
Dept: ORTHOPEDIC SURGERY | Age: 80
End: 2025-04-30

## 2025-04-30 VITALS
TEMPERATURE: 97 F | OXYGEN SATURATION: 90 % | HEART RATE: 51 BPM | SYSTOLIC BLOOD PRESSURE: 130 MMHG | DIASTOLIC BLOOD PRESSURE: 71 MMHG

## 2025-04-30 DIAGNOSIS — T84.84XA PAINFUL ORTHOPAEDIC HARDWARE: Primary | ICD-10-CM

## 2025-04-30 DIAGNOSIS — G62.9 PERIPHERAL POLYNEUROPATHY: ICD-10-CM

## 2025-04-30 PROCEDURE — 99024 POSTOP FOLLOW-UP VISIT: CPT | Performed by: STUDENT IN AN ORGANIZED HEALTH CARE EDUCATION/TRAINING PROGRAM

## 2025-04-30 RX ORDER — METHYLPREDNISOLONE 4 MG/1
TABLET ORAL
Qty: 1 KIT | Refills: 0 | Status: SHIPPED | OUTPATIENT
Start: 2025-04-30 | End: 2025-05-06

## 2025-04-30 NOTE — PROGRESS NOTES
Follow Up Post Operative Visit     Surgery: Removal of painful orthopedic hardware from left ankle  Date: 3/21/2025    Subjective:    Shelia Mooney is here for follow up visit s/p above procedure.  She is doing well and her pain is well-controlled.  She does notice swelling to the medial ankle.  She does complain of pain that radiates from her lower back down the back of her hip thigh and down to her foot.      Controlled Substances Monitoring:        Physical Exam:    BP: 130/71    General: Alert and oriented x3, no acute distress  Cardiovascular/pulmonary: No labored breathing, peripheral perfusion intact  Musculoskeletal:    Left ankle: Mature scar formation.  Mild edema noted over the medial malleolus.  No signs of infection or drainage.  Plantarflexion dorsiflexion intact and symmetric with contralateral side.  Brisk cap refill.      Imaging: 3 views of the left ankle demonstrate removal of 1 syndesmotic screw, distal end of second syndesmotic screw within the tibia but not across the syndesmosis.  Complete removal of medial mall screws.  No signs of complication.  No signs of refracture    Assessment and Plan: 6 weeks status post removal hardware from left ankle  - Lumbar radiculopathy  - Left hip pain  - Sciatic nerve pain    - She is doing well and is happy with her results.  Recommend physical therapy for her hip and groin pain as well as her left ankle.  Referral placed for physical therapy.  Referral placed to [facility] for further evaluation of the lumbar radiculopathy and hip pain.  Medrol Dosepak provided today to patient due to sciatic pain.  We discussed that it may take some time for the swelling to go down in her ankle and that she will likely experience some swelling after being on her feet for long periods of time.  She verbalizes understanding.  All questions and concerns were answered in detail.  She can follow-up as needed      GREY Hughes-CNP  Orthopedic Surgery   04/30/25  3:49

## 2025-05-01 ENCOUNTER — TELEPHONE (OUTPATIENT)
Dept: PHYSICAL THERAPY | Age: 80
End: 2025-05-01

## 2025-05-05 ENCOUNTER — TELEPHONE (OUTPATIENT)
Dept: PHYSICAL THERAPY | Age: 80
End: 2025-05-05

## 2025-05-13 ENCOUNTER — OFFICE VISIT (OUTPATIENT)
Dept: PHYSICAL MEDICINE AND REHAB | Age: 80
End: 2025-05-13
Payer: MEDICARE

## 2025-05-13 VITALS
HEIGHT: 59 IN | DIASTOLIC BLOOD PRESSURE: 79 MMHG | WEIGHT: 116 LBS | BODY MASS INDEX: 23.39 KG/M2 | HEART RATE: 96 BPM | SYSTOLIC BLOOD PRESSURE: 142 MMHG

## 2025-05-13 DIAGNOSIS — M25.552 GREATER TROCHANTERIC PAIN SYNDROME OF LEFT LOWER EXTREMITY: ICD-10-CM

## 2025-05-13 DIAGNOSIS — M79.2 NEUROPATHIC PAIN: Primary | ICD-10-CM

## 2025-05-13 DIAGNOSIS — M54.50 CHRONIC BILATERAL LOW BACK PAIN WITHOUT SCIATICA: ICD-10-CM

## 2025-05-13 DIAGNOSIS — G89.29 CHRONIC BILATERAL LOW BACK PAIN WITHOUT SCIATICA: ICD-10-CM

## 2025-05-13 PROCEDURE — 99204 OFFICE O/P NEW MOD 45 MIN: CPT | Performed by: STUDENT IN AN ORGANIZED HEALTH CARE EDUCATION/TRAINING PROGRAM

## 2025-05-13 PROCEDURE — 1159F MED LIST DOCD IN RCRD: CPT | Performed by: STUDENT IN AN ORGANIZED HEALTH CARE EDUCATION/TRAINING PROGRAM

## 2025-05-13 PROCEDURE — 1123F ACP DISCUSS/DSCN MKR DOCD: CPT | Performed by: STUDENT IN AN ORGANIZED HEALTH CARE EDUCATION/TRAINING PROGRAM

## 2025-05-13 PROCEDURE — 1160F RVW MEDS BY RX/DR IN RCRD: CPT | Performed by: STUDENT IN AN ORGANIZED HEALTH CARE EDUCATION/TRAINING PROGRAM

## 2025-05-13 NOTE — PROGRESS NOTES
Mercedes Gifford MD  Physical Medicine & Rehabilitation  [address] [facility] [address]  [phone]       PCP: Lili Starr DO  Date of visit: 5/13/25    Chief Complaint   Patient presents with    Referral - General     Patient presents for pain in the outter hip that has been present since having surgery in the ankle. Reports that she is unable to sit on the side at all. Reports that she was also informed that she has arthriits In the groin area. All these issues initiated from a fall.        History of Present Illness  The patient presents for evaluation of left hip pain.    She reports experiencing pain in her left hip, which she believes is unrelated to her knee condition. The onset of her hip pain was approximately 6 months ago. She experiences pain in her groin area, which she attributes to arthritis. She also reports constant back pain, which is alleviated when she removes her bra. She reports no numbness or tingling in her upper thigh. She reports no new night sweats, fevers, unintentional weight loss, or personal history of cancer. She also reports no numbness in her groin. She has not attempted any self-treatment for her hip pain. She has previously undergone physical therapy but did not continue the exercises at home. She has been taking Tylenol Arthritis as needed but is unsure of its effectiveness. She has also taken ibuprofen 800 mg in the past.    She was involved in an accident in January 2024, resulting in a fractured ankle that required two surgeries within a span of two weeks.  She reports that the pain has improved since the removal of the screws. She experiences occasional swelling in her foot, which is managed with a compression device. She is scheduled to start physical therapy next week for her hip and ankle. She also reports a bump at the end of the plate, which causes discomfort but is not intolerable. She describes the pain as an ache, which is more pronounced in her outer hip than

## 2025-05-22 ENCOUNTER — HOSPITAL ENCOUNTER (OUTPATIENT)
Dept: GENERAL RADIOLOGY | Age: 80
Discharge: HOME | End: 2025-05-24
Payer: MEDICARE

## 2025-05-22 ENCOUNTER — HOSPITAL ENCOUNTER (OUTPATIENT)
Dept: PHYSICAL THERAPY | Age: 80
Setting detail: THERAPIES SERIES
Discharge: HOME | End: 2025-05-22
Payer: MEDICARE

## 2025-05-22 DIAGNOSIS — M54.50 CHRONIC BILATERAL LOW BACK PAIN WITHOUT SCIATICA: ICD-10-CM

## 2025-05-22 DIAGNOSIS — M25.552 GREATER TROCHANTERIC PAIN SYNDROME OF LEFT LOWER EXTREMITY: ICD-10-CM

## 2025-05-22 DIAGNOSIS — G89.29 CHRONIC BILATERAL LOW BACK PAIN WITHOUT SCIATICA: ICD-10-CM

## 2025-05-22 PROCEDURE — 97161 PT EVAL LOW COMPLEX 20 MIN: CPT | Performed by: PHYSICAL THERAPIST

## 2025-05-22 PROCEDURE — 72100 X-RAY EXAM L-S SPINE 2/3 VWS: CPT

## 2025-05-22 ASSESSMENT — PAIN DESCRIPTION - PAIN TYPE: TYPE: CHRONIC PAIN;SURGICAL PAIN

## 2025-05-22 ASSESSMENT — PAIN DESCRIPTION - LOCATION: LOCATION: HIP;ANKLE

## 2025-05-22 ASSESSMENT — PAIN DESCRIPTION - ORIENTATION: ORIENTATION: LEFT

## 2025-05-22 ASSESSMENT — PAIN SCALES - GENERAL: PAINLEVEL_OUTOF10: 5

## 2025-05-22 NOTE — PROGRESS NOTES
Physical Therapy    Physical Therapy: Initial Evaluation    Patient: Shelia Mooney (80 y.o. female)   Examination Date: 2025  Plan of Care Certification Period: 2025 to        :  1945 ;    Confirmed: Yes MRN: [id number]  CSN: [id number]   Insurance: Payor: [facility] MEDICARE / Plan: [facility] MEDICARE / Product Type: *No Product type* /   Insurance ID: [id number] - (Medicare Managed) Secondary Insurance (if applicable):    Referring Physician: Preeti De La Fuente APRN *     PCP: Lili Starr DO Visits to Date/Visits Approved: 1 /      No Show/Cancelled Appts:   /       Medical Diagnosis: Painful orthopaedic hardware [T84.84XA]    Treatment Diagnosis:       PERTINENT MEDICAL HISTORY           Medical History: Chart Reviewed: Yes   Past Medical History:   Diagnosis Date    Anxiety     Hyperlipidemia     Hypertension     Painful orthopaedic hardware     left ankle    Prolonged emergence from general anesthesia     Thyroid disease     Vitamin D deficiency      Surgical History:   Past Surgical History:   Procedure Laterality Date    ANKLE FRACTURE SURGERY Left 2024    EXTERNAL FIXATOR LEFT ANKLE performed by Derik Curry DO at [facility] [place]    ANKLE FRACTURE SURGERY Left 2024    REMOVAL EXTERNAL FIXATOR LEFT LOWER EXTREMITY, OPEN REDUCTION INTERNAL FIXATION LEFT ANKLE TRIMALLEOLAR FRATURE performed by Derik Curry DO at [facility] [place]    ANKLE SURGERY Left 3/21/2025    LEFT ANKLE HARDWARE REMOVAL performed by Derik Curry DO at [facility] OR    APPENDECTOMY      COLONOSCOPY      FINGER TRIGGER RELEASE      HERNIA REPAIR      KNEE SURGERY Left 2016    LAPAROSCOPY      OVARIAN CYST SURGERY      RUPTURED OVARIAN CYST       Medications:   Current Outpatient Medications:     diclofenac sodium (VOLTAREN) 1 % GEL, Apply 2 g topically 2 times daily, Disp: 350 g, Rfl: 0    aspirin 81 MG EC tablet, Take 1 tablet by mouth in the morning and at bedtime, Disp: 56 tablet, Rfl: 0    ALBUTEROL

## 2025-06-09 ENCOUNTER — RESULTS FOLLOW-UP (OUTPATIENT)
Dept: PHYSICAL MEDICINE AND REHAB | Age: 80
End: 2025-06-09

## 2025-06-11 ENCOUNTER — HOSPITAL ENCOUNTER (OUTPATIENT)
Dept: PHYSICAL THERAPY | Age: 80
Setting detail: THERAPIES SERIES
Discharge: HOME | End: 2025-06-11
Payer: MEDICARE

## 2025-06-11 PROCEDURE — 97110 THERAPEUTIC EXERCISES: CPT | Performed by: PHYSICAL THERAPIST

## 2025-06-11 PROCEDURE — 97530 THERAPEUTIC ACTIVITIES: CPT | Performed by: PHYSICAL THERAPIST

## 2025-06-11 NOTE — PROGRESS NOTES
complications  [] Other: Performed neuro screen prior to exercise. AROM/strength of all extremities is WFL. LT sensation remains intact bilaterally. No facial abnormalities. Overall gait remains stable without AD. Performed light there ex/act to increase LE strength and reduce symptoms. Pt completed all there ex without issue. PT recommends pt contact PCP/ER if symptoms persist/increase     Plan:   [x] Continue per plan of care [] Alter current plan (see comments)  [] Plan of care initiated [] Hold pending MD visit [] Discharge  Plan for Next Session:         Treatment Charges: Mins Units   Initial Evaluation     Re-Evaluation     Ther Exercise         TE 10 1   Manual Therapy     MT     Ther Activities        TA 20 1   Gait Training          GT     Neuro Re-education NR     Modalities     Non-Billable Service Time 10    Other     Total Time/Units 40 2     Electronically signed by:  Shiv Snell PT

## 2025-06-12 ENCOUNTER — APPOINTMENT (OUTPATIENT)
Dept: CT IMAGING | Age: 80
End: 2025-06-12
Payer: MEDICARE

## 2025-06-12 ENCOUNTER — HOSPITAL ENCOUNTER (EMERGENCY)
Age: 80
Discharge: HOME | End: 2025-06-12
Attending: EMERGENCY MEDICINE
Payer: MEDICARE

## 2025-06-12 VITALS
DIASTOLIC BLOOD PRESSURE: 59 MMHG | HEART RATE: 85 BPM | OXYGEN SATURATION: 99 % | SYSTOLIC BLOOD PRESSURE: 139 MMHG | TEMPERATURE: 97.9 F | RESPIRATION RATE: 16 BRPM | HEIGHT: 59 IN | BODY MASS INDEX: 23.79 KG/M2 | WEIGHT: 118 LBS

## 2025-06-12 DIAGNOSIS — R51.9 ACUTE NONINTRACTABLE HEADACHE, UNSPECIFIED HEADACHE TYPE: Primary | ICD-10-CM

## 2025-06-12 PROCEDURE — 99284 EMERGENCY DEPT VISIT MOD MDM: CPT

## 2025-06-12 PROCEDURE — 6360000002 HC RX W HCPCS: Performed by: EMERGENCY MEDICINE

## 2025-06-12 PROCEDURE — 96372 THER/PROPH/DIAG INJ SC/IM: CPT

## 2025-06-12 PROCEDURE — 6370000000 HC RX 637 (ALT 250 FOR IP): Performed by: EMERGENCY MEDICINE

## 2025-06-12 PROCEDURE — 70450 CT HEAD/BRAIN W/O DYE: CPT

## 2025-06-12 RX ORDER — IBUPROFEN 400 MG/1
400 TABLET, FILM COATED ORAL ONCE
Status: COMPLETED | OUTPATIENT
Start: 2025-06-12 | End: 2025-06-12

## 2025-06-12 RX ORDER — DIPHENHYDRAMINE HYDROCHLORIDE 50 MG/ML
25 INJECTION, SOLUTION INTRAMUSCULAR; INTRAVENOUS ONCE
Status: DISCONTINUED | OUTPATIENT
Start: 2025-06-12 | End: 2025-06-12 | Stop reason: HOSPADM

## 2025-06-12 RX ORDER — SUMATRIPTAN 6 MG/.5ML
6 INJECTION, SOLUTION SUBCUTANEOUS ONCE
Status: COMPLETED | OUTPATIENT
Start: 2025-06-12 | End: 2025-06-12

## 2025-06-12 RX ORDER — METOCLOPRAMIDE HYDROCHLORIDE 5 MG/ML
10 INJECTION INTRAMUSCULAR; INTRAVENOUS ONCE
Status: DISCONTINUED | OUTPATIENT
Start: 2025-06-12 | End: 2025-06-12 | Stop reason: HOSPADM

## 2025-06-12 RX ORDER — DEXAMETHASONE SODIUM PHOSPHATE 4 MG/ML
4 INJECTION, SOLUTION INTRA-ARTICULAR; INTRALESIONAL; INTRAMUSCULAR; INTRAVENOUS; SOFT TISSUE ONCE
Status: COMPLETED | OUTPATIENT
Start: 2025-06-12 | End: 2025-06-12

## 2025-06-12 RX ADMIN — IBUPROFEN 400 MG: 400 TABLET, FILM COATED ORAL at 12:20

## 2025-06-12 RX ADMIN — SUMATRIPTAN SUCCINATE 6 MG: 6 INJECTION SUBCUTANEOUS at 12:19

## 2025-06-12 RX ADMIN — DEXAMETHASONE SODIUM PHOSPHATE 4 MG: 4 INJECTION INTRA-ARTICULAR; INTRALESIONAL; INTRAMUSCULAR; INTRAVENOUS; SOFT TISSUE at 12:19

## 2025-06-12 ASSESSMENT — LIFESTYLE VARIABLES
HOW MANY STANDARD DRINKS CONTAINING ALCOHOL DO YOU HAVE ON A TYPICAL DAY: 1 OR 2
HOW OFTEN DO YOU HAVE A DRINK CONTAINING ALCOHOL: MONTHLY OR LESS

## 2025-06-12 ASSESSMENT — PAIN - FUNCTIONAL ASSESSMENT
PAIN_FUNCTIONAL_ASSESSMENT: ACTIVITIES ARE NOT PREVENTED
PAIN_FUNCTIONAL_ASSESSMENT: 0-10
PAIN_FUNCTIONAL_ASSESSMENT: ACTIVITIES ARE NOT PREVENTED

## 2025-06-12 ASSESSMENT — PAIN SCALES - GENERAL
PAINLEVEL_OUTOF10: 7
PAINLEVEL_OUTOF10: 7

## 2025-06-12 ASSESSMENT — PAIN DESCRIPTION - DESCRIPTORS
DESCRIPTORS: ACHING
DESCRIPTORS: ACHING

## 2025-06-12 ASSESSMENT — PAIN DESCRIPTION - LOCATION
LOCATION: HEAD
LOCATION: HEAD

## 2025-06-12 ASSESSMENT — PAIN DESCRIPTION - PAIN TYPE: TYPE: ACUTE PAIN

## 2025-06-12 NOTE — ED PROVIDER NOTES
HPI:  6/12/25,   Time: 11:19 AM EDT       Shelia Mooney is a 80 y.o. female presenting to the ED for ha, beginning 2 days ago.  The complaint has been intermittent, mild in severity, and worsened by nothing.  Sharp occipital region.  Intermittent for past 2 days.  Worse on laying down.  No focal weakness.  No sensory changes no vision changes    Review of Systems:   Pertinent positives and negatives are stated within HPI, all other systems reviewed and are negative.          --------------------------------------------- PAST HISTORY ---------------------------------------------  Past Medical History:  has a past medical history of Anxiety, Hyperlipidemia, Hypertension, Painful orthopaedic hardware, Prolonged emergence from general anesthesia, Thyroid disease, and Vitamin D deficiency.    Past Surgical History:  has a past surgical history that includes hernia repair; Appendectomy; Finger trigger release; laparoscopy (1997); Ovarian cyst surgery (1962); knee surgery (Left, 2016); Ankle fracture surgery (Left, 01/23/2024); Ankle fracture surgery (Left, 02/06/2024); Colonoscopy; and Ankle surgery (Left, 3/21/2025).    Social History:  reports that she quit smoking about 28 years ago. Her smoking use included cigarettes. She has never used smokeless tobacco. She reports current alcohol use. She reports that she does not use drugs.    Family History: family history includes Anxiety Disorder in her mother and another family member; Arthritis in her father and mother; Breast Cancer in her maternal aunt; Diabetes in her father, maternal aunt, and maternal aunt; Heart Disease in her mother; Lung Cancer in her father; Migraines in her maternal grandmother; Other in her brother.     The patient’s home medications have been reviewed.    Allergies: Adhesive tape        ---------------------------------------------------PHYSICAL EXAM--------------------------------------    Constitutional/General: Alert and oriented x3, well  injection 6 mg (6 mg SubCUTAneous Given 6/12/25 1219)   ibuprofen (ADVIL;MOTRIN) tablet 400 mg (400 mg Oral Given 6/12/25 1220)   dexAMETHasone (DECADRON) injection 4 mg (4 mg IntraMUSCular Given 6/12/25 1219)         ED COURSE:             This patient's ED course included: a personal history and physicial examination    This patient has remained hemodynamically stable during their ED course.      Re-Evaluations:             Re-evaluation.  Patient’s symptoms are improving    Re-examination  6/12/25   11:19 AM EDT          Vital Signs:   Vitals:    06/12/25 1108 06/12/25 1213   BP: 125/68 (!) 139/59   Pulse: 83 85   Resp: 17 16   Temp: 97.9 °F (36.6 °C)    TempSrc: Oral    SpO2: 99% 99%   Weight: 53.5 kg (118 lb)    Height: 1.499 m (4' 11\")              Counseling:   The emergency provider has spoken with the patient and discussed today’s results, in addition to providing specific details for the plan of care and counseling regarding the diagnosis and prognosis.  Questions are answered at this time and they are agreeable with the plan.       --------------------------------- IMPRESSION AND DISPOSITION ---------------------------------    IMPRESSION  1. Acute nonintractable headache, unspecified headache type        DISPOSITION  Disposition: Discharge to home  Patient condition is stable    NOTE: This report was transcribed using voice recognition software. Every effort was made to ensure accuracy; however, inadvertent computerized transcription errors may be present        Nicolás Wakefield MD  06/13/25 2002

## 2025-06-13 ENCOUNTER — HOSPITAL ENCOUNTER (OUTPATIENT)
Dept: PHYSICAL THERAPY | Age: 80
Setting detail: THERAPIES SERIES
Discharge: HOME | End: 2025-06-13
Payer: MEDICARE

## 2025-06-13 PROCEDURE — 97530 THERAPEUTIC ACTIVITIES: CPT | Performed by: PHYSICAL THERAPIST

## 2025-06-13 PROCEDURE — 97110 THERAPEUTIC EXERCISES: CPT | Performed by: PHYSICAL THERAPIST

## 2025-06-13 NOTE — PROGRESS NOTES
visit [] Discharge  Plan for Next Session:         Treatment Charges: Mins Units   Initial Evaluation     Re-Evaluation     Ther Exercise         TE 10 1   Manual Therapy     MT     Ther Activities        TA 20 1   Gait Training          GT     Neuro Re-education NR     Modalities     Non-Billable Service Time 10    Other     Total Time/Units 40 2     Electronically signed by:  Shiv Snell PT

## 2025-06-17 ENCOUNTER — HOSPITAL ENCOUNTER (OUTPATIENT)
Dept: PHYSICAL THERAPY | Age: 80
Setting detail: THERAPIES SERIES
Discharge: HOME | End: 2025-06-17
Payer: MEDICARE

## 2025-06-17 PROCEDURE — 97530 THERAPEUTIC ACTIVITIES: CPT | Performed by: PHYSICAL THERAPIST

## 2025-06-17 PROCEDURE — 97110 THERAPEUTIC EXERCISES: CPT | Performed by: PHYSICAL THERAPIST

## 2025-06-17 NOTE — PROGRESS NOTES
Discharge  Plan for Next Session:         Treatment Charges: Mins Units   Initial Evaluation     Re-Evaluation     Ther Exercise         TE 10 1   Manual Therapy     MT     Ther Activities        TA 20 1   Gait Training          GT     Neuro Re-education NR     Modalities     Non-Billable Service Time 10    Other     Total Time/Units 40 2     Electronically signed by:  Shiv Snell PT

## 2025-06-20 ENCOUNTER — HOSPITAL ENCOUNTER (OUTPATIENT)
Dept: PHYSICAL THERAPY | Age: 80
Setting detail: THERAPIES SERIES
Discharge: HOME | End: 2025-06-20
Payer: MEDICARE

## 2025-06-20 PROCEDURE — 97530 THERAPEUTIC ACTIVITIES: CPT | Performed by: PHYSICAL THERAPIST

## 2025-06-20 PROCEDURE — 97110 THERAPEUTIC EXERCISES: CPT | Performed by: PHYSICAL THERAPIST

## 2025-06-20 NOTE — PROGRESS NOTES
[facility]  Phone: [phone] Fax: [phone]       Physical Therapy Daily Treatment Note  Date:  2025    Patient Name:  Shelia Mooney    :  1945  MRN: [id number]    Patient: Shelia Mooney (80 y.o. female)   Examination Date: 2025  Plan of Care Certification Period: 2025 to     :  1945 ;    Confirmed: Yes MRN: [id number]  CSN: [id number]   Insurance: Payor: [place] [facility]ICARE / [facility] [place] [facility] / Product Type: *No Product type* /   Insurance ID: [id number] - (Medicare Managed) Secondary Insurance (if applicable):    Referring Physician: Preeti De La Fuente APRN *     PCP: Lili Starr DO Visits to Date/Visits Approved: eval+ 4 /      No Show/Cancelled Appts:   /       Medical Diagnosis: Painful orthopaedic hardware [T84.84XA]    Treatment Diagnosis:       Time In:      1330  Time Out:  1410     Subjective:  pt reports no new issues. Some back pain today from gardening. Uses voltaren cream for symptom relief   Exercises:  Exercise/Equipment Resistance/Repetitions Other comments    Bike  [id number]            Marching   2x10ea foam    Mini squat    X20 foam     Step ups         Side    X20ea 6in  X20ea 6in    Side ambulation   X2 laps //bars             Gastroc stretch   5x10s      Heel raise   x20                                                                               Other Therapeutic Activities:      Home Exercise Program:      Manual Treatments:      Modalities:      Timed Code Treatment Minutes:      Total Treatment Minutes:      Treatment/Activity Tolerance:  [x] Patient tolerated treatment well [] Patient limited by fatigue  [] Patient limited by pain  [] Patient limited by other medical complications  [] Other: Performed light there ex to increase AROM/strength of LE. Pt demonstrating increased independence with HEP. Good pacing and technique throughout. Overall stable gait  throughout.     Plan:   [x] Continue per

## 2025-06-24 ENCOUNTER — HOSPITAL ENCOUNTER (OUTPATIENT)
Dept: PHYSICAL THERAPY | Age: 80
Setting detail: THERAPIES SERIES
Discharge: HOME | End: 2025-06-24
Payer: MEDICARE

## 2025-06-24 PROCEDURE — 97530 THERAPEUTIC ACTIVITIES: CPT | Performed by: PHYSICAL THERAPIST

## 2025-06-24 PROCEDURE — 97110 THERAPEUTIC EXERCISES: CPT | Performed by: PHYSICAL THERAPIST

## 2025-06-24 NOTE — PROGRESS NOTES
[facility]  Phone: [phone] Fax: [phone]       Physical Therapy Daily Treatment Note  Date:  2025    Patient Name:  Shelia Mooney    :  1945  MRN: [id number]    Patient: Shelia Mooney (80 y.o. female)   Examination Date: 2025  Plan of Care Certification Period: 2025 to     :  1945 ;    Confirmed: Yes MRN: [id number]  CSN: [id number]   Ins[place]: Payor: [place] [facility]ICARE / [facility] [place] [facility] / Product Type: *No Product type* /   Insurance ID: [id number] - (Medicare Managed) Secondary Insurance (if applicable):    Referring Physician: Preeti De La Fuente APRN *     PCP: Lili Starr DO Visits to Date/Visits Approved: eval+ 5/      No Show/Cancelled Appts:   /       Medical Diagnosis: Painful orthopaedic hardware [T84.84XA]    Treatment Diagnosis:       Time In:      1330  Time Out:  1415     Subjective:  pt reports no new issues. States LLE cont to swell   Exercises:  Exercise/Equipment Resistance/Repetitions Other comments    Bike  [id number]            Marching   2x10ea foam    Mini squat    X20 foam     Step ups         Side    X20ea 6in  X20ea 6in    Side ambulation   X2 laps //bars             Gastroc stretch   5x10s      Heel raise   x20                                                                               Other Therapeutic Activities:      Home Exercise Program:      Manual Treatments:      Modalities:      Timed Code Treatment Minutes:      Total Treatment Minutes:      Treatment/Activity Tolerance:  [x] Patient tolerated treatment well [] Patient limited by fatigue  [] Patient limited by pain  [] Patient limited by other medical complications  [] Other: Performed light there ex to increase AROM/strength of LE. Pt demonstrating increased independence with HEP. Good pacing and technique throughout. Overall stable gait  throughout. Mild notable edema to LLE    Plan:   [x] Continue per plan of care [] Alter

## 2025-07-01 ENCOUNTER — HOSPITAL ENCOUNTER (OUTPATIENT)
Dept: PHYSICAL THERAPY | Age: 80
Setting detail: THERAPIES SERIES
End: 2025-07-01
Payer: MEDICARE

## 2025-07-01 ENCOUNTER — OFFICE VISIT (OUTPATIENT)
Dept: PHYSICAL MEDICINE AND REHAB | Age: 80
End: 2025-07-01
Payer: MEDICARE

## 2025-07-01 ENCOUNTER — HOSPITAL ENCOUNTER (OUTPATIENT)
Dept: GENERAL RADIOLOGY | Age: 80
Discharge: HOME | End: 2025-07-03
Payer: MEDICARE

## 2025-07-01 VITALS
BODY MASS INDEX: 23.59 KG/M2 | SYSTOLIC BLOOD PRESSURE: 127 MMHG | HEART RATE: 73 BPM | HEIGHT: 59 IN | WEIGHT: 117 LBS | DIASTOLIC BLOOD PRESSURE: 67 MMHG

## 2025-07-01 DIAGNOSIS — T84.84XA PAINFUL ORTHOPAEDIC HARDWARE: ICD-10-CM

## 2025-07-01 DIAGNOSIS — M79.2 NEUROPATHIC PAIN: ICD-10-CM

## 2025-07-01 DIAGNOSIS — G89.29 CHRONIC PAIN OF LEFT ANKLE: ICD-10-CM

## 2025-07-01 DIAGNOSIS — G89.29 CHRONIC BILATERAL LOW BACK PAIN WITHOUT SCIATICA: ICD-10-CM

## 2025-07-01 DIAGNOSIS — G89.29 CHRONIC PAIN OF LEFT KNEE: ICD-10-CM

## 2025-07-01 DIAGNOSIS — G89.29 CHRONIC PAIN OF LEFT KNEE: Primary | ICD-10-CM

## 2025-07-01 DIAGNOSIS — M25.572 CHRONIC PAIN OF LEFT ANKLE: ICD-10-CM

## 2025-07-01 DIAGNOSIS — M25.562 CHRONIC PAIN OF LEFT KNEE: Primary | ICD-10-CM

## 2025-07-01 DIAGNOSIS — M25.562 CHRONIC PAIN OF LEFT KNEE: ICD-10-CM

## 2025-07-01 DIAGNOSIS — M54.50 CHRONIC BILATERAL LOW BACK PAIN WITHOUT SCIATICA: ICD-10-CM

## 2025-07-01 PROCEDURE — 1160F RVW MEDS BY RX/DR IN RCRD: CPT | Performed by: STUDENT IN AN ORGANIZED HEALTH CARE EDUCATION/TRAINING PROGRAM

## 2025-07-01 PROCEDURE — 1123F ACP DISCUSS/DSCN MKR DOCD: CPT | Performed by: STUDENT IN AN ORGANIZED HEALTH CARE EDUCATION/TRAINING PROGRAM

## 2025-07-01 PROCEDURE — 99214 OFFICE O/P EST MOD 30 MIN: CPT | Performed by: STUDENT IN AN ORGANIZED HEALTH CARE EDUCATION/TRAINING PROGRAM

## 2025-07-01 PROCEDURE — 1125F AMNT PAIN NOTED PAIN PRSNT: CPT | Performed by: STUDENT IN AN ORGANIZED HEALTH CARE EDUCATION/TRAINING PROGRAM

## 2025-07-01 PROCEDURE — 1159F MED LIST DOCD IN RCRD: CPT | Performed by: STUDENT IN AN ORGANIZED HEALTH CARE EDUCATION/TRAINING PROGRAM

## 2025-07-01 PROCEDURE — 73564 X-RAY EXAM KNEE 4 OR MORE: CPT

## 2025-07-01 NOTE — PROGRESS NOTES
Mercedes Gifford MD  Physical Medicine & Rehabilitation  [address] [facility] [address]  [phone]     7/1/25    Chief Complaint   Patient presents with    Follow-up     PT follow up. Patient feels that once or twice a week is not enough to make a difference. Sciatic pain is much better but left knee and ankle pain.       HPI:  Shelia Mooney is a 80 y.o. year old woman seen today in follow up regarding hip and knee pain.     History of Present Illness  The patient presents for evaluation of left knee pain and ankle swelling.    Her physical therapy primarily involves walking exercises, with no specific strengthening exercises. She reports significant improvement in her hip condition, estimating it to be almost fully recovered. However, she experiences occasional mild pain, which does not worsen with sitting. She is considering resuming the use of the medication intermittently.    She has been using the medication on her knee inconsistently. She experiences pain and swelling in her knee, which was particularly severe last night. The pain is localized to the inner part of her knee and is present even at rest. She has noticed increased knee problems since her ankle fracture, which was not a concern prior to the fracture. She also reports groin pain, which she was informed is due to arthritis. She has been using a TENS unit for her knee. She has no known vascular disorders. She underwent meniscus surgery in 2016 following a fall while climbing stairs. She received a steroid injection from Dr. Tanner Lewis, sports medicine physician through [facility], which provided some relief.    She continues to experience swelling in her fractured ankle, which was previously treated by Dr. Curry. Despite the removal of some hardware, a half screw remains in place. She has not used Voltaren on her ankle.    Past Medical History:   Diagnosis Date    Anxiety     Hyperlipidemia     Hypertension     Painful orthopaedic hardware

## 2025-07-01 NOTE — PATIENT INSTRUCTIONS
- Continue voltaren to the left ankle and the left knee  - Add a compression sock to the left leg - this should help with swelling of the ankle  - follow up in 2 weeks for tentative injection

## 2025-07-08 ENCOUNTER — HOSPITAL ENCOUNTER (OUTPATIENT)
Dept: PHYSICAL THERAPY | Age: 80
Setting detail: THERAPIES SERIES
Discharge: HOME | End: 2025-07-08
Payer: MEDICARE

## 2025-07-08 PROCEDURE — 97530 THERAPEUTIC ACTIVITIES: CPT | Performed by: PHYSICAL THERAPIST

## 2025-07-08 PROCEDURE — 97110 THERAPEUTIC EXERCISES: CPT | Performed by: PHYSICAL THERAPIST

## 2025-07-08 NOTE — PROGRESS NOTES
[facility]  Phone: [phone] Fax: [phone]       Physical Therapy Daily Treatment Note  Date:  2025    Patient Name:  Shelia Mooney    :  1945  MRN: [id number]    Patient: Shelia Mooney (80 y.o. female)   Examination Date: 2025  Plan of Care Certification Period: 2025 to     :  1945 ;    Confirmed: Yes MRN: [id number]  CSN: [id number]   Insurance: Payor: [facility] MEDICARE / Plan: [facility] [place] MEDICARE / Product Type: *No Product type* /   Insurance ID: [id number] - (Medicare Managed) Secondary Insurance (if applicable):    Referring Physician: Preeti De La Fuente APRN *     PCP: Lili Starr DO Visits to Date/Visits Approved: eval+ 6/      No Show/Cancelled Appts:   /       Medical Diagnosis: Painful orthopaedic hardware [T84.84XA]    Treatment Diagnosis:       Time In:      1445  Time Out:  1530     Subjective:  pt reports seeing physician the other day. Cont to have knee pain especially in evening hours  Exercises:  Exercise/Equipment Resistance/Repetitions Other comments    Bike  [id number]            Marching   2x10ea foam    Mini squat    X20 foam     Step ups         Side    X20ea 6in  X20ea 6in    Side ambulation   X5 laps //bars  OTB            Gastroc stretch   5x10s                  3 way hip   x10ea                                                              Other Therapeutic Activities:      Home Exercise Program:      Manual Treatments:      Modalities:      Timed Code Treatment Minutes:      Total Treatment Minutes:      Treatment/Activity Tolerance:  [x] Patient tolerated treatment well [] Patient limited by fatigue  [] Patient limited by pain  [] Patient limited by other medical complications  [] Other: Performed light there ex to increase AROM/strength of LE. Pt demonstrating increased independence with HEP. Added 3 way hip to increase LE strength/stability. Progressed side ambulation with addition of Tband for increased

## 2025-07-11 ENCOUNTER — HOSPITAL ENCOUNTER (OUTPATIENT)
Dept: PHYSICAL THERAPY | Age: 80
Setting detail: THERAPIES SERIES
Discharge: HOME | End: 2025-07-11
Payer: MEDICARE

## 2025-07-11 PROCEDURE — 97110 THERAPEUTIC EXERCISES: CPT | Performed by: PHYSICAL THERAPIST

## 2025-07-11 PROCEDURE — 97530 THERAPEUTIC ACTIVITIES: CPT | Performed by: PHYSICAL THERAPIST

## 2025-07-11 NOTE — PROGRESS NOTES
across anterior hip with side ambulation. No other complaints. Good pacing and technique throughout. Overall stable gait  throughout.     Plan:   [x] Continue per plan of care [] Alter current plan (see comments)  [] Plan of care initiated [] Hold pending MD visit [] Discharge  Plan for Next Session:         Treatment Charges: Mins Units   Initial Evaluation     Re-Evaluation     Ther Exercise         TE 10 1   Manual Therapy     MT     Ther Activities        TA 35 2   Gait Training          GT     Neuro Re-education NR     Modalities     Non-Billable Service Time 5    Other     Total Time/Units 50 3     Electronically signed by:  Shiv Snell PT

## 2025-07-16 ENCOUNTER — HOSPITAL ENCOUNTER (OUTPATIENT)
Dept: PHYSICAL THERAPY | Age: 80
Setting detail: THERAPIES SERIES
Discharge: HOME | End: 2025-07-16
Payer: MEDICARE

## 2025-07-16 PROCEDURE — 97110 THERAPEUTIC EXERCISES: CPT | Performed by: PHYSICAL THERAPIST

## 2025-07-16 PROCEDURE — 97530 THERAPEUTIC ACTIVITIES: CPT | Performed by: PHYSICAL THERAPIST

## 2025-07-16 NOTE — PROGRESS NOTES
[facility]  Phone: [phone] Fax: [phone]       Physical Therapy Daily Treatment Note  Date:  2025    Patient Name:  Shelia Mooney    :  1945  MRN: [id number]    Patient: Shelia Mooney (80 y.o. female)   Examination Date: 2025  Plan of Care Certification Period: 2025 to     :  1945 ;    Confirmed: Yes MRN: [id number]  CSN: [id number]   Insurance: Payor: [facility] MEDICARE / Plan: [facility] [place] MEDICARE / Product Type: *No Product type* /   Insurance ID: [id number] - (Medicare Managed) Secondary Insurance (if applicable):    Referring Physician: Preeti De La Fuente APRN *     PCP: Lili Starr DO Visits to Date/Visits Approved: eval+ 8/      No Show/Cancelled Appts:   /       Medical Diagnosis: Painful orthopaedic hardware [T84.84XA]    Treatment Diagnosis:       Time In:      1430  Time Out:  1520     Subjective:  pt reports not remaining consistent with HEP  Exercises:  Exercise/Equipment Resistance/Repetitions Other comments    Bike  [id number]            Marching   2x10ea foam    Mini squat    X20 foam     Step ups         Side    X20ea 6in  X20ea 6in    Side ambulation   X5 laps //bars  BTB            Gastroc stretch   5x10s      Heel raise   x20              3 way hip   x10ea                                                              Other Therapeutic Activities:      Home Exercise Program:      Manual Treatments:      Modalities:      Timed Code Treatment Minutes:      Total Treatment Minutes:      Treatment/Activity Tolerance:  [x] Patient tolerated treatment well [] Patient limited by fatigue  [] Patient limited by pain  [] Patient limited by other medical complications  [] Other: Performed light there ex to increase AROM/strength of LE. Pt demonstrating increased independence with HEP. Cont  3 way hip to increase LE strength/stability. No complaints of pain throughout session. Good pacing and technique throughout. Overall stable gait

## 2025-07-18 ENCOUNTER — HOSPITAL ENCOUNTER (OUTPATIENT)
Dept: PHYSICAL THERAPY | Age: 80
Setting detail: THERAPIES SERIES
Discharge: HOME | End: 2025-07-18
Payer: MEDICARE

## 2025-07-18 PROCEDURE — 97110 THERAPEUTIC EXERCISES: CPT | Performed by: PHYSICAL THERAPIST

## 2025-07-18 PROCEDURE — 97530 THERAPEUTIC ACTIVITIES: CPT | Performed by: PHYSICAL THERAPIST

## 2025-07-18 NOTE — PROGRESS NOTES
[facility]  Phone: [phone] Fax: [phone]       Physical Therapy Daily Treatment Note  Date:  2025    Patient Name:  Shelia Mooney    :  1945  MRN: [id number]    Patient: Shelia Mooney (80 y.o. female)   Examination Date: 2025  Plan of Care Certification Period: 2025 to     :  1945 ;    Confirmed: Yes MRN: [id number]  CSN: [id number]   Insurance: Payor: [place] [facility] [place]ICARE / Plan: [facility] [place] MED[place]ARE / Product Type: *No Product type* /   Insurance ID: [id number] - (Medicare Managed) Secondary Insurance (if applicable):    Referring Physician: Preeti De La Fuente APRN *     PCP: Lili Starr DO Visits to Date/Visits Approved: eval+ 9      No Show/Cancelled Appts:   /       Medical Diagnosis: Painful orthopaedic hardware [T84.84XA]    Treatment Diagnosis:       Time In:      1250  Time Out:  1350     Subjective:  pt reports still not performing HEP. Does believe she is having occasional swelling across LLE  Exercises:  Exercise/Equipment Resistance/Repetitions Other comments    Bike  [id number]            Marching   2x10ea foam    Mini squat    X20 foam     Step ups         Side    X20ea 6in  X20ea 6in    Side ambulation   X5 laps //bars  BTB     Fwd/bwd  X5l laps //bars      Gastroc stretch   5x10s      Heel raise   x20              3 way hip   x10ea                                                              Other Therapeutic Activities:      Home Exercise Program:      Manual Treatments:      Modalities:      Timed Code Treatment Minutes:      Total Treatment Minutes:      Treatment/Activity Tolerance:  [x] Patient tolerated treatment well [] Patient limited by fatigue  [] Patient limited by pain  [] Patient limited by other medical complications  [] Other: Performed light there ex to increase AROM/strength of LE. Pt demonstrating increased independence with HEP. Cont  3 way hip to increase LE strength/stability. Good pacing and technique

## 2025-07-23 ENCOUNTER — TELEPHONE (OUTPATIENT)
Dept: PHYSICAL MEDICINE AND REHAB | Age: 80
End: 2025-07-23

## 2025-07-24 ENCOUNTER — HOSPITAL ENCOUNTER (OUTPATIENT)
Dept: PHYSICAL THERAPY | Age: 80
Setting detail: THERAPIES SERIES
Discharge: HOME | End: 2025-07-24
Payer: MEDICARE

## 2025-07-24 ENCOUNTER — PROCEDURE VISIT (OUTPATIENT)
Dept: PHYSICAL MEDICINE AND REHAB | Age: 80
End: 2025-07-24
Payer: MEDICARE

## 2025-07-24 VITALS — WEIGHT: 117 LBS | HEIGHT: 59 IN | BODY MASS INDEX: 23.59 KG/M2

## 2025-07-24 DIAGNOSIS — M17.12 PRIMARY OSTEOARTHRITIS OF LEFT KNEE: ICD-10-CM

## 2025-07-24 DIAGNOSIS — M25.562 CHRONIC PAIN OF LEFT KNEE: Primary | ICD-10-CM

## 2025-07-24 DIAGNOSIS — G89.29 CHRONIC PAIN OF LEFT KNEE: Primary | ICD-10-CM

## 2025-07-24 PROCEDURE — 20610 DRAIN/INJ JOINT/BURSA W/O US: CPT | Performed by: STUDENT IN AN ORGANIZED HEALTH CARE EDUCATION/TRAINING PROGRAM

## 2025-07-24 PROCEDURE — 97530 THERAPEUTIC ACTIVITIES: CPT | Performed by: PHYSICAL THERAPIST

## 2025-07-24 PROCEDURE — 97110 THERAPEUTIC EXERCISES: CPT | Performed by: PHYSICAL THERAPIST

## 2025-07-24 NOTE — PROGRESS NOTES
[facility]  Phone: [phone] Fax: [phone]       Physical Therapy Daily Treatment Note  Date:  2025    Patient Name:  Shelia Mooney    :  1945  MRN: [id number]    Patient: Shelia Mooney (80 y.o. female)   Examination Date: 2025  Plan of Care Certification Period: 2025 to     :  1945 ;    Confirmed: Yes MRN: [id number]  CSN: [id number]   Insurance: Payor: [facility] MEDICARE / Plan: [facility] [place] MED[place]ARE / Product Type: *No Product type* /   Insurance ID: [id number] - (Medicare Managed) Secondary Insurance (if applicable):    Referring Physician: Preeti De La Fuente APRN *     PCP: Lili Starr DO Visits to Date/Visits Approved: eval+ 10      No Show/Cancelled Appts:   /       Medical Diagnosis: Painful orthopaedic hardware [T84.84XA]    Treatment Diagnosis:       Time In:      850  Time Out:  930     Subjective:  pt reports still not performing HEP. Does believe she is having occasional swelling across LLE  Exercises:  Exercise/Equipment Resistance/Repetitions Other comments    Bike  [id number]              Mini squat    X20      Step ups         Side    X20ea 6in  X20ea 6in    Side ambulation   X5 laps //bars  BTB     Fwd/bwd  X5l laps //bars      Gastroc stretch   5x10s      Heel raise   x20              3 way hip   x10ea                                                              Other Therapeutic Activities:      Home Exercise Program:      Manual Treatments:      Modalities:      Timed Code Treatment Minutes:      Total Treatment Minutes:      Treatment/Activity Tolerance:  [x] Patient tolerated treatment well [] Patient limited by fatigue  [] Patient limited by pain  [] Patient limited by other medical complications  [] Other: Performed light there ex to increase AROM/strength of LE. Pt demonstrating independence with HEP. Cont  3 way hip to increase LE strength/stability. Good pacing and technique throughout. Overall stable gait

## 2025-07-28 RX ORDER — TRIAMCINOLONE ACETONIDE 40 MG/ML
40 INJECTION, SUSPENSION INTRA-ARTICULAR; INTRAMUSCULAR ONCE
Status: COMPLETED | OUTPATIENT
Start: 2025-07-28 | End: 2025-07-28

## 2025-07-28 RX ADMIN — Medication 2 ML: at 12:10

## 2025-07-28 RX ADMIN — TRIAMCINOLONE ACETONIDE 40 MG: 40 INJECTION, SUSPENSION INTRA-ARTICULAR; INTRAMUSCULAR at 12:09

## 2025-07-31 NOTE — PROGRESS NOTES
Mercedes Gifford MD  [facility]  [address] [place] [id number]  Dept: [phone]  Loc: [phone]       07/24/25    Chief Complaint   Patient presents with    Procedure     Patient presents for injection in knee.        Last injection: >3 months  Taking anticoagulants/antiplatelets: ASA  Diabetic: No  Febrile/active infection: No    Ambulatory Procedure Time Out  Correct Patient: Yes  Correct Procedure: Yes  Correct Site/Side: Yes  Correct Site(s) Marked: Yes  Informed Consent Signed: Yes  Allergies Verified: Yes  Staff Present & Credential:: RYNE PEÑA    Diagnosis:  1. Chronic pain of left knee    2. Primary osteoarthritis of left knee        After explaining the indications, risks, benefits and alternatives of a left knee joint injection, the patient agreed to proceed.  A permit was signed and scanned into the chart. The skin on the lateral knee was prepared with betadine and alcohol. Using aseptic no touch technique, a 25 gauge, 1.5\" needle with 1 cc of Kenalog 40mg/cc and 2 cc of 1% lidocaine was directed to the knee joint.  After negative aspiration, the medication was injected.  Adequate hemostasis was achieved and a bandage applied. The patient tolerated the procedure well and was educated in post injection care. The patient was clinically monitored after the injection and left the office without incident. There was post injection reduction in pain.     Mercedes Gifford MD  Board Certified Physical Medicine and Rehabilitation     Administrations This Visit       lidocaine 1 % injection 2 mL       Admin Date  07/28/2025  12:10 Action  Given Dose  2 mL Route  Intra-artICUlar Site  Knee Left Documented By  Daphney Luna MA    NDC: 36630-506-51    Lot#: [id number]    : [facility]    Patient Supplied?: No              triamcinolone acetonide (KENALOG-40) injection 40 mg       Admin Date  07/28/2025  12:09

## (undated) DEVICE — WIPES SKIN CLOTH READYPREP 9 X 10.5 IN 2% CHLORHEX GLUCONATE CHG PREOP

## (undated) DEVICE — PIN FIX L225MM DIA6MM S STL FOR L EXT FIX SET

## (undated) DEVICE — TOWEL,OR,DSP,ST,BLUE,STD,6/PK,12PK/CS: Brand: MEDLINE

## (undated) DEVICE — GLOVE ORANGE PI 8 1/2   MSG9085

## (undated) DEVICE — APPLICATOR MEDICATED 26 CC SOLUTION HI LT ORNG CHLORAPREP

## (undated) DEVICE — TUBING SUCT 12FR MAL ALUM SHFT FN CAP VENT UNIV CONN W/ OBT

## (undated) DEVICE — DRESSING,GAUZE,XEROFORM,CURAD,5"X9",ST: Brand: CURAD

## (undated) DEVICE — CLAMP EXT FIX DIA8/11MM COMB CLP ON SELF HLD

## (undated) DEVICE — LOWER EXT KNEE DRAPE: Brand: MEDLINE INDUSTRIES, INC.

## (undated) DEVICE — PAD,ABDOMINAL,5"X9",ST,LF,25/BX: Brand: MEDLINE INDUSTRIES, INC.

## (undated) DEVICE — DRAPE EQUIP CARM 72X42 IN RUBBER BND CLP

## (undated) DEVICE — ELECTRODE PT RET AD L9FT HI MOIST COND ADH HYDRGEL CORDED

## (undated) DEVICE — DRAPE,EXTREMITY,89X128,STERILE: Brand: MEDLINE

## (undated) DEVICE — C-ARMOR C-ARM EQUIPMENT COVERS CLEAR STERILE UNIVERSAL FIT 12 PER CASE: Brand: C-ARMOR

## (undated) DEVICE — BIT DRL L110MM DIA2.5MM ST G QUIK CPL NONRADIOPAQUE W/O STP

## (undated) DEVICE — CLAMP EXT FIX L TI ALLY COMB CLP ON SELF HLD

## (undated) DEVICE — BANDAGE COMPR W6INXL12FT SMOOTH FOR LIMB EXSANG ESMARCH

## (undated) DEVICE — BANDAGE COMPR W4INXL10YD WHITE/BEIGE E MTRX HK LOOP CLSR

## (undated) DEVICE — TAPE ADH W3INXL10YD WHT COT WVN BK POWERFUL RUB BASE HIGHLY

## (undated) DEVICE — PADDING,UNDERCAST,COTTON, 4"X4YD STERILE: Brand: MEDLINE

## (undated) DEVICE — SCREW EXT FIX L150MM DIA5MM THRD L150MM S STL SELF DRL MR

## (undated) DEVICE — NEEDLE HYPO 23GA L1.5IN TURQ POLYPR HUB S STL THN WALL IM

## (undated) DEVICE — SOLUTION SCRB 4OZ 4% CHG H2O AIDED FOR PREOPERATIVE SKIN

## (undated) DEVICE — 3M™ IOBAN™ 2 ANTIMICROBIAL INCISE DRAPE 6650EZ: Brand: IOBAN™ 2

## (undated) DEVICE — ROD EXT FIX L300MM DIA11MM C FBR MR CONDITIONAL

## (undated) DEVICE — Device

## (undated) DEVICE — GUIDEWIRE ORTH L150MM DIA1.25MM S STL NTHRD FOR 4MM CANN

## (undated) DEVICE — SCREW EXT FIX L80MM DIA4MM THRD DIA3MM S STL SELF DRL BLNT

## (undated) DEVICE — GAUZE,SPONGE,4"X4",8PLY,STRL,LF,10/TRAY: Brand: MEDLINE

## (undated) DEVICE — GLOVE ORTHO 8   MSG9480

## (undated) DEVICE — ROD EXT FIX L250MM DIA11MM C FBR MR CONDITIONAL

## (undated) DEVICE — GOWN,SIRUS,POLYRNF,BRTHSLV,XL,30/CS: Brand: MEDLINE

## (undated) DEVICE — GLOVE SURG SZ 85 L12IN FNGR ORTHO 126MIL CRM LTX FREE

## (undated) DEVICE — DRAPE C ARM W41XL74IN UNIV MOB W RUBBERBAND CLP

## (undated) DEVICE — ROD EXT FIX L150MM DIA11MM C FBR MR CONDITIONAL

## (undated) DEVICE — DRESSING,GAUZE,XEROFORM,CURAD,1"X8",ST: Brand: CURAD

## (undated) DEVICE — POST EXT FIX DIA11MM 30DEG OUTRIG MAG RESONANCE CONDITIONAL

## (undated) DEVICE — DOUBLE BASIN SET: Brand: MEDLINE INDUSTRIES, INC.

## (undated) DEVICE — DRAPE,REIN 53X77,STERILE: Brand: MEDLINE

## (undated) DEVICE — 4-PORT MANIFOLD: Brand: NEPTUNE 2

## (undated) DEVICE — BIT DRL L200MM DIA2.8MM CALIB L100MM FOR 3.5MM VA LCP PROX

## (undated) DEVICE — ROD EXT FIX L125MM DIA11MM C FBR MR CONDITIONAL FOR LNG BNE

## (undated) DEVICE — ROD EXT FIX L350MM DIA11MM C FBR MR CONDITIONAL

## (undated) DEVICE — SKIN PREP TRAY 4 COMPARTM TRAY: Brand: MEDLINE INDUSTRIES, INC.

## (undated) DEVICE — BIT DRL L180MM DIA2.5MM QUIK CPL NONRADIOPAQUE W/O STP

## (undated) DEVICE — BIT DRL L140MM DIA2MM QUIK CPL 3 FLUT CALIB DEPTH MRK W/O

## (undated) DEVICE — CLAMP EXT FIX L PIN 6 POS MAG RESONANCE CONDITIONAL

## (undated) DEVICE — BIT DRL L160MM DIA2.7MM ST CANN QUIK CPL NONRADIOLUCENT ADJ

## (undated) DEVICE — BLADE,STAINLESS-STEEL,15,STRL,DISPOSABLE: Brand: MEDLINE